# Patient Record
Sex: MALE | Race: OTHER | URBAN - METROPOLITAN AREA
[De-identification: names, ages, dates, MRNs, and addresses within clinical notes are randomized per-mention and may not be internally consistent; named-entity substitution may affect disease eponyms.]

---

## 2017-07-07 ENCOUNTER — EMERGENCY (EMERGENCY)
Facility: HOSPITAL | Age: 62
LOS: 0 days | Discharge: HOME | End: 2017-07-07

## 2017-07-07 DIAGNOSIS — E11.65 TYPE 2 DIABETES MELLITUS WITH HYPERGLYCEMIA: ICD-10-CM

## 2017-07-07 DIAGNOSIS — I10 ESSENTIAL (PRIMARY) HYPERTENSION: ICD-10-CM

## 2022-10-26 ENCOUNTER — INPATIENT (INPATIENT)
Facility: HOSPITAL | Age: 67
LOS: 6 days | Discharge: HOME | End: 2022-11-02
Attending: STUDENT IN AN ORGANIZED HEALTH CARE EDUCATION/TRAINING PROGRAM | Admitting: STUDENT IN AN ORGANIZED HEALTH CARE EDUCATION/TRAINING PROGRAM

## 2022-10-26 VITALS
TEMPERATURE: 103 F | SYSTOLIC BLOOD PRESSURE: 116 MMHG | HEART RATE: 111 BPM | WEIGHT: 175.05 LBS | RESPIRATION RATE: 20 BRPM | OXYGEN SATURATION: 95 % | DIASTOLIC BLOOD PRESSURE: 61 MMHG

## 2022-10-26 LAB
ALBUMIN SERPL ELPH-MCNC: 3.3 G/DL — LOW (ref 3.5–5.2)
ALP SERPL-CCNC: 96 U/L — SIGNIFICANT CHANGE UP (ref 30–115)
ALT FLD-CCNC: 58 U/L — HIGH (ref 0–41)
ANION GAP SERPL CALC-SCNC: 15 MMOL/L — HIGH (ref 7–14)
AST SERPL-CCNC: 57 U/L — HIGH (ref 0–41)
BASOPHILS # BLD AUTO: 0.03 K/UL — SIGNIFICANT CHANGE UP (ref 0–0.2)
BASOPHILS NFR BLD AUTO: 0.2 % — SIGNIFICANT CHANGE UP (ref 0–1)
BILIRUB SERPL-MCNC: 0.5 MG/DL — SIGNIFICANT CHANGE UP (ref 0.2–1.2)
BUN SERPL-MCNC: 29 MG/DL — HIGH (ref 10–20)
CALCIUM SERPL-MCNC: 8.3 MG/DL — LOW (ref 8.4–10.5)
CHLORIDE SERPL-SCNC: 92 MMOL/L — LOW (ref 98–110)
CO2 SERPL-SCNC: 23 MMOL/L — SIGNIFICANT CHANGE UP (ref 17–32)
CREAT SERPL-MCNC: 1.6 MG/DL — HIGH (ref 0.7–1.5)
EGFR: 47 ML/MIN/1.73M2 — LOW
EOSINOPHIL # BLD AUTO: 0 K/UL — SIGNIFICANT CHANGE UP (ref 0–0.7)
EOSINOPHIL NFR BLD AUTO: 0 % — SIGNIFICANT CHANGE UP (ref 0–8)
GLUCOSE SERPL-MCNC: 328 MG/DL — HIGH (ref 70–99)
HCT VFR BLD CALC: 34.9 % — LOW (ref 42–52)
HGB BLD-MCNC: 12.3 G/DL — LOW (ref 14–18)
IMM GRANULOCYTES NFR BLD AUTO: 0.7 % — HIGH (ref 0.1–0.3)
LACTATE SERPL-SCNC: 2.1 MMOL/L — HIGH (ref 0.7–2)
LYMPHOCYTES # BLD AUTO: 0.66 K/UL — LOW (ref 1.2–3.4)
LYMPHOCYTES # BLD AUTO: 4.8 % — LOW (ref 20.5–51.1)
MCHC RBC-ENTMCNC: 30.3 PG — SIGNIFICANT CHANGE UP (ref 27–31)
MCHC RBC-ENTMCNC: 35.2 G/DL — SIGNIFICANT CHANGE UP (ref 32–37)
MCV RBC AUTO: 86 FL — SIGNIFICANT CHANGE UP (ref 80–94)
MONOCYTES # BLD AUTO: 1.04 K/UL — HIGH (ref 0.1–0.6)
MONOCYTES NFR BLD AUTO: 7.6 % — SIGNIFICANT CHANGE UP (ref 1.7–9.3)
NEUTROPHILS # BLD AUTO: 11.95 K/UL — HIGH (ref 1.4–6.5)
NEUTROPHILS NFR BLD AUTO: 86.7 % — HIGH (ref 42.2–75.2)
NRBC # BLD: 0 /100 WBCS — SIGNIFICANT CHANGE UP (ref 0–0)
PLATELET # BLD AUTO: 242 K/UL — SIGNIFICANT CHANGE UP (ref 130–400)
POTASSIUM SERPL-MCNC: 3.6 MMOL/L — SIGNIFICANT CHANGE UP (ref 3.5–5)
POTASSIUM SERPL-SCNC: 3.6 MMOL/L — SIGNIFICANT CHANGE UP (ref 3.5–5)
PROT SERPL-MCNC: 6.2 G/DL — SIGNIFICANT CHANGE UP (ref 6–8)
RBC # BLD: 4.06 M/UL — LOW (ref 4.7–6.1)
RBC # FLD: 12.9 % — SIGNIFICANT CHANGE UP (ref 11.5–14.5)
SODIUM SERPL-SCNC: 130 MMOL/L — LOW (ref 135–146)
WBC # BLD: 13.77 K/UL — HIGH (ref 4.8–10.8)
WBC # FLD AUTO: 13.77 K/UL — HIGH (ref 4.8–10.8)

## 2022-10-26 PROCEDURE — 71045 X-RAY EXAM CHEST 1 VIEW: CPT | Mod: 26

## 2022-10-26 PROCEDURE — 93010 ELECTROCARDIOGRAM REPORT: CPT

## 2022-10-26 PROCEDURE — 99285 EMERGENCY DEPT VISIT HI MDM: CPT

## 2022-10-26 RX ORDER — SODIUM CHLORIDE 9 MG/ML
2000 INJECTION INTRAMUSCULAR; INTRAVENOUS; SUBCUTANEOUS ONCE
Refills: 0 | Status: COMPLETED | OUTPATIENT
Start: 2022-10-26 | End: 2022-10-26

## 2022-10-26 RX ORDER — CEFTRIAXONE 500 MG/1
1000 INJECTION, POWDER, FOR SOLUTION INTRAMUSCULAR; INTRAVENOUS ONCE
Refills: 0 | Status: COMPLETED | OUTPATIENT
Start: 2022-10-26 | End: 2022-10-26

## 2022-10-26 RX ORDER — AZITHROMYCIN 500 MG/1
500 TABLET, FILM COATED ORAL ONCE
Refills: 0 | Status: COMPLETED | OUTPATIENT
Start: 2022-10-26 | End: 2022-10-26

## 2022-10-26 RX ORDER — ACETAMINOPHEN 500 MG
975 TABLET ORAL ONCE
Refills: 0 | Status: COMPLETED | OUTPATIENT
Start: 2022-10-26 | End: 2022-10-26

## 2022-10-26 RX ADMIN — AZITHROMYCIN 255 MILLIGRAM(S): 500 TABLET, FILM COATED ORAL at 23:30

## 2022-10-26 RX ADMIN — CEFTRIAXONE 100 MILLIGRAM(S): 500 INJECTION, POWDER, FOR SOLUTION INTRAMUSCULAR; INTRAVENOUS at 23:30

## 2022-10-26 RX ADMIN — Medication 975 MILLIGRAM(S): at 23:20

## 2022-10-26 RX ADMIN — SODIUM CHLORIDE 2000 MILLILITER(S): 9 INJECTION INTRAMUSCULAR; INTRAVENOUS; SUBCUTANEOUS at 23:30

## 2022-10-26 NOTE — ED ADULT NURSE NOTE - CHIEF COMPLAINT QUOTE
Pt c/o sob, fever, cough X 6 days. Was diagnosed w PNA by pmd, started antibiotics. Pt had two COVID swabs negative

## 2022-10-26 NOTE — ED ADULT TRIAGE NOTE - CHIEF COMPLAINT QUOTE
Pt c/o sob, fever, cough X 6 days. Was diagnosed w PNA by pmd, started antibiotics Pt c/o sob, fever, cough X 6 days. Was diagnosed w PNA by pmd, started antibiotics. Pt had two COVID swabs negative

## 2022-10-26 NOTE — ED ADULT NURSE NOTE - OBJECTIVE STATEMENT
pt presents co SOB, generalized weakness. Pt was seen at his PCP and found to have pneumonia on scans and was sent for evaluation. Pt AAOx4, speaking in clear and complete sentences.

## 2022-10-27 LAB
ALBUMIN SERPL ELPH-MCNC: 3.3 G/DL — LOW (ref 3.5–5.2)
ALP SERPL-CCNC: 103 U/L — SIGNIFICANT CHANGE UP (ref 30–115)
ALT FLD-CCNC: 64 U/L — HIGH (ref 0–41)
ANION GAP SERPL CALC-SCNC: 16 MMOL/L — HIGH (ref 7–14)
AST SERPL-CCNC: 63 U/L — HIGH (ref 0–41)
BASE EXCESS BLDV CALC-SCNC: -1.6 MMOL/L — SIGNIFICANT CHANGE UP (ref -2–3)
BASOPHILS # BLD AUTO: 0.04 K/UL — SIGNIFICANT CHANGE UP (ref 0–0.2)
BASOPHILS NFR BLD AUTO: 0.3 % — SIGNIFICANT CHANGE UP (ref 0–1)
BILIRUB SERPL-MCNC: 0.5 MG/DL — SIGNIFICANT CHANGE UP (ref 0.2–1.2)
BUN SERPL-MCNC: 19 MG/DL — SIGNIFICANT CHANGE UP (ref 10–20)
CA-I SERPL-SCNC: 1.03 MMOL/L — LOW (ref 1.15–1.33)
CALCIUM SERPL-MCNC: 8.3 MG/DL — LOW (ref 8.4–10.5)
CHLORIDE SERPL-SCNC: 103 MMOL/L — SIGNIFICANT CHANGE UP (ref 98–110)
CO2 SERPL-SCNC: 25 MMOL/L — SIGNIFICANT CHANGE UP (ref 17–32)
CREAT ?TM UR-MCNC: 116 MG/DL — SIGNIFICANT CHANGE UP
CREAT SERPL-MCNC: 0.9 MG/DL — SIGNIFICANT CHANGE UP (ref 0.7–1.5)
EGFR: 93 ML/MIN/1.73M2 — SIGNIFICANT CHANGE UP
EOSINOPHIL # BLD AUTO: 0.03 K/UL — SIGNIFICANT CHANGE UP (ref 0–0.7)
EOSINOPHIL NFR BLD AUTO: 0.2 % — SIGNIFICANT CHANGE UP (ref 0–8)
FLUAV AG NPH QL: SIGNIFICANT CHANGE UP
FLUBV AG NPH QL: SIGNIFICANT CHANGE UP
GAS PNL BLDV: 129 MMOL/L — LOW (ref 136–145)
GAS PNL BLDV: SIGNIFICANT CHANGE UP
GAS PNL BLDV: SIGNIFICANT CHANGE UP
GLUCOSE BLDC GLUCOMTR-MCNC: 134 MG/DL — HIGH (ref 70–99)
GLUCOSE BLDC GLUCOMTR-MCNC: 179 MG/DL — HIGH (ref 70–99)
GLUCOSE BLDC GLUCOMTR-MCNC: 221 MG/DL — HIGH (ref 70–99)
GLUCOSE BLDC GLUCOMTR-MCNC: 270 MG/DL — HIGH (ref 70–99)
GLUCOSE SERPL-MCNC: 166 MG/DL — HIGH (ref 70–99)
HCO3 BLDV-SCNC: 23 MMOL/L — SIGNIFICANT CHANGE UP (ref 22–29)
HCT VFR BLD CALC: 37.1 % — LOW (ref 42–52)
HCT VFR BLDA CALC: 39 % — SIGNIFICANT CHANGE UP (ref 39–51)
HGB BLD CALC-MCNC: 13 G/DL — SIGNIFICANT CHANGE UP (ref 12.6–17.4)
HGB BLD-MCNC: 12.5 G/DL — LOW (ref 14–18)
IMM GRANULOCYTES NFR BLD AUTO: 0.8 % — HIGH (ref 0.1–0.3)
LACTATE BLDV-MCNC: 1.4 MMOL/L — SIGNIFICANT CHANGE UP (ref 0.5–2)
LYMPHOCYTES # BLD AUTO: 0.82 K/UL — LOW (ref 1.2–3.4)
LYMPHOCYTES # BLD AUTO: 6.2 % — LOW (ref 20.5–51.1)
MAGNESIUM SERPL-MCNC: 1.5 MG/DL — LOW (ref 1.8–2.4)
MCHC RBC-ENTMCNC: 29.6 PG — SIGNIFICANT CHANGE UP (ref 27–31)
MCHC RBC-ENTMCNC: 33.7 G/DL — SIGNIFICANT CHANGE UP (ref 32–37)
MCV RBC AUTO: 87.7 FL — SIGNIFICANT CHANGE UP (ref 80–94)
MONOCYTES # BLD AUTO: 1.19 K/UL — HIGH (ref 0.1–0.6)
MONOCYTES NFR BLD AUTO: 8.9 % — SIGNIFICANT CHANGE UP (ref 1.7–9.3)
MRSA PCR RESULT.: NEGATIVE — SIGNIFICANT CHANGE UP
NEUTROPHILS # BLD AUTO: 11.12 K/UL — HIGH (ref 1.4–6.5)
NEUTROPHILS NFR BLD AUTO: 83.6 % — HIGH (ref 42.2–75.2)
NRBC # BLD: 0 /100 WBCS — SIGNIFICANT CHANGE UP (ref 0–0)
PCO2 BLDV: 37 MMHG — LOW (ref 42–55)
PH BLDV: 7.4 — SIGNIFICANT CHANGE UP (ref 7.32–7.43)
PLATELET # BLD AUTO: 272 K/UL — SIGNIFICANT CHANGE UP (ref 130–400)
PO2 BLDV: 38 MMHG — SIGNIFICANT CHANGE UP
POTASSIUM BLDV-SCNC: 3.3 MMOL/L — LOW (ref 3.5–5.1)
POTASSIUM SERPL-MCNC: 3.7 MMOL/L — SIGNIFICANT CHANGE UP (ref 3.5–5)
POTASSIUM SERPL-SCNC: 3.7 MMOL/L — SIGNIFICANT CHANGE UP (ref 3.5–5)
PROT ?TM UR-MCNC: 100 MG/DLG/24H — SIGNIFICANT CHANGE UP
PROT SERPL-MCNC: 6.4 G/DL — SIGNIFICANT CHANGE UP (ref 6–8)
PROT/CREAT UR-RTO: 0.9 RATIO — HIGH (ref 0–0.2)
RBC # BLD: 4.23 M/UL — LOW (ref 4.7–6.1)
RBC # FLD: 13.3 % — SIGNIFICANT CHANGE UP (ref 11.5–14.5)
RSV RNA NPH QL NAA+NON-PROBE: SIGNIFICANT CHANGE UP
SAO2 % BLDV: 69.4 % — SIGNIFICANT CHANGE UP
SARS-COV-2 RNA SPEC QL NAA+PROBE: SIGNIFICANT CHANGE UP
SODIUM SERPL-SCNC: 144 MMOL/L — SIGNIFICANT CHANGE UP (ref 135–146)
SODIUM UR-SCNC: 75 MMOL/L — SIGNIFICANT CHANGE UP
WBC # BLD: 13.31 K/UL — HIGH (ref 4.8–10.8)
WBC # FLD AUTO: 13.31 K/UL — HIGH (ref 4.8–10.8)

## 2022-10-27 PROCEDURE — 99223 1ST HOSP IP/OBS HIGH 75: CPT

## 2022-10-27 PROCEDURE — 76775 US EXAM ABDO BACK WALL LIM: CPT | Mod: 26

## 2022-10-27 RX ORDER — ATORVASTATIN CALCIUM 80 MG/1
40 TABLET, FILM COATED ORAL AT BEDTIME
Refills: 0 | Status: DISCONTINUED | OUTPATIENT
Start: 2022-10-27 | End: 2022-10-28

## 2022-10-27 RX ORDER — MAGNESIUM SULFATE 500 MG/ML
2 VIAL (ML) INJECTION
Refills: 0 | Status: COMPLETED | OUTPATIENT
Start: 2022-10-27 | End: 2022-10-27

## 2022-10-27 RX ORDER — SODIUM CHLORIDE 9 MG/ML
500 INJECTION, SOLUTION INTRAVENOUS ONCE
Refills: 0 | Status: COMPLETED | OUTPATIENT
Start: 2022-10-27 | End: 2022-10-27

## 2022-10-27 RX ORDER — INSULIN LISPRO 100/ML
5 VIAL (ML) SUBCUTANEOUS
Refills: 0 | Status: DISCONTINUED | OUTPATIENT
Start: 2022-10-27 | End: 2022-10-31

## 2022-10-27 RX ORDER — AZITHROMYCIN 500 MG/1
500 TABLET, FILM COATED ORAL EVERY 24 HOURS
Refills: 0 | Status: DISCONTINUED | OUTPATIENT
Start: 2022-10-27 | End: 2022-10-27

## 2022-10-27 RX ORDER — DEXTROSE 50 % IN WATER 50 %
25 SYRINGE (ML) INTRAVENOUS ONCE
Refills: 0 | Status: DISCONTINUED | OUTPATIENT
Start: 2022-10-27 | End: 2022-11-02

## 2022-10-27 RX ORDER — DEXTROSE 50 % IN WATER 50 %
15 SYRINGE (ML) INTRAVENOUS ONCE
Refills: 0 | Status: DISCONTINUED | OUTPATIENT
Start: 2022-10-27 | End: 2022-11-02

## 2022-10-27 RX ORDER — INSULIN GLARGINE 100 [IU]/ML
6 INJECTION, SOLUTION SUBCUTANEOUS AT BEDTIME
Refills: 0 | Status: DISCONTINUED | OUTPATIENT
Start: 2022-10-27 | End: 2022-10-28

## 2022-10-27 RX ORDER — GLUCAGON INJECTION, SOLUTION 0.5 MG/.1ML
1 INJECTION, SOLUTION SUBCUTANEOUS ONCE
Refills: 0 | Status: DISCONTINUED | OUTPATIENT
Start: 2022-10-27 | End: 2022-11-02

## 2022-10-27 RX ORDER — ACETAMINOPHEN 500 MG
650 TABLET ORAL EVERY 6 HOURS
Refills: 0 | Status: DISCONTINUED | OUTPATIENT
Start: 2022-10-27 | End: 2022-11-02

## 2022-10-27 RX ORDER — INSULIN LISPRO 100/ML
VIAL (ML) SUBCUTANEOUS
Refills: 0 | Status: DISCONTINUED | OUTPATIENT
Start: 2022-10-27 | End: 2022-11-02

## 2022-10-27 RX ORDER — SODIUM CHLORIDE 9 MG/ML
1000 INJECTION, SOLUTION INTRAVENOUS
Refills: 0 | Status: DISCONTINUED | OUTPATIENT
Start: 2022-10-27 | End: 2022-11-02

## 2022-10-27 RX ORDER — DEXTROSE 50 % IN WATER 50 %
12.5 SYRINGE (ML) INTRAVENOUS ONCE
Refills: 0 | Status: DISCONTINUED | OUTPATIENT
Start: 2022-10-27 | End: 2022-11-02

## 2022-10-27 RX ORDER — CEFTRIAXONE 500 MG/1
1000 INJECTION, POWDER, FOR SOLUTION INTRAMUSCULAR; INTRAVENOUS EVERY 24 HOURS
Refills: 0 | Status: DISCONTINUED | OUTPATIENT
Start: 2022-10-27 | End: 2022-10-29

## 2022-10-27 RX ORDER — HEPARIN SODIUM 5000 [USP'U]/ML
5000 INJECTION INTRAVENOUS; SUBCUTANEOUS EVERY 8 HOURS
Refills: 0 | Status: DISCONTINUED | OUTPATIENT
Start: 2022-10-27 | End: 2022-10-31

## 2022-10-27 RX ORDER — ENOXAPARIN SODIUM 100 MG/ML
40 INJECTION SUBCUTANEOUS EVERY 24 HOURS
Refills: 0 | Status: DISCONTINUED | OUTPATIENT
Start: 2022-10-27 | End: 2022-10-27

## 2022-10-27 RX ORDER — SODIUM CHLORIDE 9 MG/ML
1000 INJECTION INTRAMUSCULAR; INTRAVENOUS; SUBCUTANEOUS
Refills: 0 | Status: DISCONTINUED | OUTPATIENT
Start: 2022-10-27 | End: 2022-10-30

## 2022-10-27 RX ADMIN — SODIUM CHLORIDE 75 MILLILITER(S): 9 INJECTION INTRAMUSCULAR; INTRAVENOUS; SUBCUTANEOUS at 07:53

## 2022-10-27 RX ADMIN — Medication 25 GRAM(S): at 15:45

## 2022-10-27 RX ADMIN — SODIUM CHLORIDE 1000 MILLILITER(S): 9 INJECTION, SOLUTION INTRAVENOUS at 01:13

## 2022-10-27 RX ADMIN — Medication 25 GRAM(S): at 15:47

## 2022-10-27 RX ADMIN — Medication 1: at 17:48

## 2022-10-27 RX ADMIN — Medication 110 MILLIGRAM(S): at 18:01

## 2022-10-27 RX ADMIN — Medication 110 MILLIGRAM(S): at 07:53

## 2022-10-27 RX ADMIN — Medication 5 UNIT(S): at 18:07

## 2022-10-27 RX ADMIN — Medication 650 MILLIGRAM(S): at 08:06

## 2022-10-27 RX ADMIN — INSULIN GLARGINE 6 UNIT(S): 100 INJECTION, SOLUTION SUBCUTANEOUS at 22:25

## 2022-10-27 RX ADMIN — Medication 2: at 08:08

## 2022-10-27 RX ADMIN — HEPARIN SODIUM 5000 UNIT(S): 5000 INJECTION INTRAVENOUS; SUBCUTANEOUS at 22:26

## 2022-10-27 RX ADMIN — SODIUM CHLORIDE 75 MILLILITER(S): 9 INJECTION INTRAMUSCULAR; INTRAVENOUS; SUBCUTANEOUS at 22:25

## 2022-10-27 RX ADMIN — ENOXAPARIN SODIUM 40 MILLIGRAM(S): 100 INJECTION SUBCUTANEOUS at 06:01

## 2022-10-27 RX ADMIN — Medication 5 UNIT(S): at 08:08

## 2022-10-27 RX ADMIN — ATORVASTATIN CALCIUM 40 MILLIGRAM(S): 80 TABLET, FILM COATED ORAL at 22:25

## 2022-10-27 RX ADMIN — HEPARIN SODIUM 5000 UNIT(S): 5000 INJECTION INTRAVENOUS; SUBCUTANEOUS at 15:46

## 2022-10-27 RX ADMIN — Medication 5 UNIT(S): at 14:00

## 2022-10-27 NOTE — ED PROVIDER NOTE - ADMIT DISPOSITION PRESENT ON ADMISSION SEPSIS Q3 - REPEAT LACTATE WAS DRAWN
----- Message from Keron Farmer sent at 3/8/2021  9:26 AM EST -----  Subject: Refill Request    QUESTIONS  Name of Medication? metFORMIN (GLUCOPHAGE) 1000 MG tablet  Patient-reported dosage and instructions? 1000mg   How many days do you have left? 1  Preferred Pharmacy? [facility] phone number (if available)? [phone]  ---------------------------------------------------------------------------  --------------    Name of Medication? hydroCHLOROthiazide (MICROZIDE) 12.5 MG capsule  Patient-reported dosage and instructions? 12.5mg  How many days do you have left? 1  Preferred Pharmacy? [facility] phone number (if available)? [phone]  ---------------------------------------------------------------------------  --------------    Name of Medication? zolpidem (AMBIEN) 10 MG tablet  Patient-reported dosage and instructions? 10mg  How many days do you have left? 1  Preferred Pharmacy? [facility] phone number (if available)? [phone]  Additional Information for Provider? Please double check pharmacy with   patient  ---------------------------------------------------------------------------  --------------  [address]  What is the best way for the office to contact you? OK to leave message on   voicemail  Preferred Call Back Phone Number?  [phone] Repeat lactate was drawn.

## 2022-10-27 NOTE — H&P ADULT - ASSESSMENT
66 yo male hx of HTN, HLD, DM presenting for fever and productive cough. Pt with productive cough with green sputum for about a week. Today started having fever. Denies sick contacts or recent travel. Pt evaluated by PMD earlier today and had CXR which found PNA for which he advised him to come to ED for evaluation. Pt denies chills, weakness, chest pain, sob, abd pain/n/v/d, or urinary sxs.      ED course:   VS: Tmax 103.5, /61, , SpO2 95% on RA     LABS:                          12.3   13.77 )-----------( 242      ( 26 Oct 2022 22:36 )             34.9     10-26    130<L>  |  92<L>  |  29<H>  ----------------------------<  328<H>  3.6   |  23  |  1.6<H>    Ca    8.3<L>      26 Oct 2022 22:36    TPro  6.2  /  Alb  3.3<L>  /  TBili  0.5  /  DBili  x   /  AST  57<H>  /  ALT  58<H>  /  AlkPhos  96  10-26    Imaging:   CXR:R lobar pneumonia         # Sepsis 2/2 to R lobar pneumonia most likely 2/2 to Strep pneumo   - Tmax 103.5, , /61  - WBC 12.3 K   - SPo2 95%  on RA  - RVP neg   - MRSA, urine strep, urine legionella, sputum culture ordered   - FU on blood Cx   - IV Abx: rocephin and azithro  - NS @ 75 cc/hr       # hypovolemic hyponatremia  - Na 131 on admission   - started on NS @ 75cc/hr  - monitor        # prerenal SANCHEZ 2/2 to hypovolemia   - Cr 1.6 on admission  - Cr 0.8 in 2020   - started on NS @75cc/hr   - avoid nephrotoxic drugs  - obtain renal US, urine studies if no improvement            #HTN       # DM          #DVT: Lovenox    # DIET: Regular    66 yo male hx of HTN, HLD, DM presenting for fever and productive cough. Pt with productive cough with green sputum for about a week.       # Sepsis 2/2 to R lobar pneumonia most likely 2/2 to Strep pneumo   - Tmax 103.5, , /61  - WBC 12.3 K   - SPo2 95%  on RA  - RVP neg   - MRSA, urine strep, urine legionella, sputum culture ordered   - FU on blood Cx   - IV Abx: rocephin and azithro  - NS @ 75 cc/hr       # hypovolemic hyponatremia  - Na 131 on admission   - started on NS @ 75cc/hr  - monitor        # prerenal SANCHEZ 2/2 to hypovolemia   - Cr 1.6 on admission  - Cr 0.8 in 2020   - started on NS @75cc/hr   - avoid nephrotoxic drugs  - obtain renal US, urine studies if no improvement            #HTN   - will keep off BP meds for borderline low BP in ED       # DM  # hld  - will start on lantus 6 and lispro 2 TID  - Monitor Fs        # DVT: Lovenox  # DIET: Regular     PT DOES NOT KNOW WHAT MEDS HE IS ON PLEASE OBTAIN MED REC IN AM

## 2022-10-27 NOTE — ED PROVIDER NOTE - PHYSICAL EXAMINATION
CONSTITUTIONAL: Well-appearing; well-nourished; in no apparent distress.   EYES: PERRL; EOM intact.   CARDIOVASCULAR: Normal S1, S2; no murmurs, rubs, or gallops.   RESPIRATORY: +rhonchi to Rt lung. RR - 16. no accessory muscles use. Normal chest excursion with respiration; breath sounds clear and equal bilaterally;   GI/: Normal bowel sounds; non-distended; non-tender; no palpable organomegaly.   MS: No calf swelling and tenderness.  SKIN: Normal for age and race; warm; dry; good turgor; no apparent lesions or exudate.   NEURO/PSYCH: A & O x 4; grossly unremarkable.

## 2022-10-27 NOTE — H&P ADULT - ATTENDING COMMENTS
Patient seen and examined at bedside independently of the residents. I read the resident's note and agree with the plan with the additions and corrections as noted below.    REVIEW OF SYSTEMS:  CONSTITUTIONAL: No weakness. Fever, chills.   EYES/ENT: No visual changes;  No vertigo or throat pain   NECK: No pain or stiffness  RESPIRATORY: No wheezing, hemoptysis; No shortness of breath. + productive cough.   CARDIOVASCULAR: No chest pain or palpitations  GASTROINTESTINAL: No abdominal or epigastric pain. No nausea, vomiting, or hematemesis; No diarrhea or constipation. No melena or hematochezia.  GENITOURINARY: No dysuria, frequency or hematuria  NEUROLOGICAL: No numbness or weakness  MSK: No pain. No weakness.   SKIN: No itching, rashes.     PMH: HTN, HLD, DM II     FHx: Reviewed. Not relevant.     Physical Exam:  GEN: No acute distress. A & O x 3.   Head: Atraumatic, Normocephalic.   Eye: PEERLA. No sclera icterus. EOMI.   ENT: Normal oropharynx, no thyromegaly.   LUNGS: Clear to auscultation bilaterally. No wheeze/rales/crackles.   HEART: Normal. S1/S2 present. RRR. No murmur/gallops.   ABD: Soft, non-tender, non-distended. Bowel sounds present.   EXT: No pitting edema.   Integumentary: No rash, No petechia.   NEURO: CN III-XII intact. Strength: 5/5 b/l ULE. Sensory intact b/l ULE.     Vital Signs Last 24 Hrs  T(C): 37.7 (27 Oct 2022 06:05), Max: 39.7 (26 Oct 2022 20:24)  T(F): 99.9 (27 Oct 2022 06:05), Max: 103.5 (26 Oct 2022 20:24)  HR: 96 (27 Oct 2022 06:05) (96 - 111)  BP: 141/61 (27 Oct 2022 06:05) (106/60 - 141/61)  BP(mean): --  RR: 16 (27 Oct 2022 06:05) (16 - 20)  SpO2: 99% (27 Oct 2022 06:05) (95% - 99%)    Parameters below as of 27 Oct 2022 06:05  Patient On (Oxygen Delivery Method): room air      Please see the above notes for Labs and radiology.     Assessment and Plan:     68 yo M with hx of HTN, HLD and DM II presents to ED for 1 week history of productive cough with fever started yesterday prompting him to went to his PMD. His PMD found PNA on CXR and advised him to come to ED.     Sepsis likely 2/2 CAP   - CXR shows RUL consolidation/opacification.  - s/p azithro and ceftriaxone in ED.  - will c/w doxycycline and ceftriaxone   - f/u BCx, sputum Cx and atypical PNA panel   - supportive care.    SANCHEZ   - serum Cr 1.6. Baseline 0.8  - c/w IVF NS   - repeat BMP   - renal US and urine studies.   - avoid nephrotoxic drugs.     Mild hyponatremia  - likely 2/2 dehydration  - c/w IVF NS @ 75cc/hr   - repeat BMP    HTN/HLD - hold anti-HTN med for now.     Hyperglycemia likely 2/2 uncontrolled DM II - check HbA1c. monitor FS AC HS. insulin regimen.     DVT ppx: heparin SC   GI ppx: not indicated.    Diet: DASH   Activity: as tolerated.     Date seen by the attending: 10/27/2022.

## 2022-10-27 NOTE — ED PROVIDER NOTE - OBJECTIVE STATEMENT
66 yo male hx of HTN/HLD/DM present c/o coughing with green sputum for about a week. started having fever today. denies sick contact and recent travel. had negative COVID test at outpatient. reported he was evaluated by PMD earlier today and had CXR which found PNA and advised him to come to ED for evaluation. denies chills/weakness/chest pain/sob/abd pain/n/v/d and urinary sxs.

## 2022-10-27 NOTE — H&P ADULT - NSHPLABSRESULTS_GEN_ALL_CORE
LABS:  cret                        12.3   13.77 )-----------( 242      ( 26 Oct 2022 22:36 )             34.9     10-26    130<L>  |  92<L>  |  29<H>  ----------------------------<  328<H>  3.6   |  23  |  1.6<H>    Ca    8.3<L>      26 Oct 2022 22:36    TPro  6.2  /  Alb  3.3<L>  /  TBili  0.5  /  DBili  x   /  AST  57<H>  /  ALT  58<H>  /  AlkPhos  96  10-26

## 2022-10-27 NOTE — H&P ADULT - NSHPPHYSICALEXAM_GEN_ALL_CORE
CONSTITUTIONAL: Well-appearing; well-nourished; in no apparent distress.   EYES: PERRL; EOM intact.   CARDIOVASCULAR: Normal S1, S2; no murmurs, rubs, or gallops.   RESPIRATORY: +rhonchi to Rt lung. RR - 16. no accessory muscles use. Normal chest excursion with respiration; breath sounds clear and equal bilaterally;   GI/: Normal bowel sounds; non-distended; non-tender; no palpable organomegaly.   MS: No calf swelling and tenderness.  SKIN: Normal for age and race; warm; dry; good turgor; no apparent lesions or exudate.   NEURO/PSYCH: A & O x 4; grossly unremarkable.
none

## 2022-10-27 NOTE — H&P ADULT - HISTORY OF PRESENT ILLNESS
68 yo male hx of HTN, HLD, DM presenting for fever and productive cough. Pt with productive cough with green sputum for about a week. Today started having fever. Denies sick contacts or recent travel. Pt evaluated by PMD earlier today and had CXR which found PNA for which he advised him to come to ED for evaluation. Pt denies chills, weakness, chest pain, sob, abd pain/n/v/d, or urinary sxs.      ED course:   VS: Tmax 103.5, /61, , SpO2 95% on RA     LABS:                          12.3   13.77 )-----------( 242      ( 26 Oct 2022 22:36 )             34.9     10-26    130<L>  |  92<L>  |  29<H>  ----------------------------<  328<H>  3.6   |  23  |  1.6<H>    Ca    8.3<L>      26 Oct 2022 22:36    TPro  6.2  /  Alb  3.3<L>  /  TBili  0.5  /  DBili  x   /  AST  57<H>  /  ALT  58<H>  /  AlkPhos  96  10-26    Imaging:   CXR:R lobar pneumonia          68 yo male, Lithuanian speaking,  PMhx of HTN, HLD, DM presenting for fever and productive cough. Pt with productive cough  green sputum for about a week. Today, started having fever which prompted him to go his PMD earlier today where he had a CXR that showed a PNA for which he was advised to come to ED for evaluation. Denies sick contacts or recent travel. Pt denies chills, weakness, chest pain, sob, abd pain/n/v/d, or urinary sxs.      ED course:   VS: Tmax 103.5, /61, , SpO2 95% on RA     LABS:                          12.3   13.77 )-----------( 242      ( 26 Oct 2022 22:36 )             34.9     10-26    130<L>  |  92<L>  |  29<H>  ----------------------------<  328<H>  3.6   |  23  |  1.6<H>    Ca    8.3<L>      26 Oct 2022 22:36    TPro  6.2  /  Alb  3.3<L>  /  TBili  0.5  /  DBili  x   /  AST  57<H>  /  ALT  58<H>  /  AlkPhos  96  10-26    Imaging:   CXR:R lobar pneumonia

## 2022-10-27 NOTE — ED PROVIDER NOTE - CLINICAL SUMMARY MEDICAL DECISION MAKING FREE TEXT BOX
68 yo M, hx of HTN, DM II currently poorly controlled, HLD here for assessment of cough with sputum x 1 week and now fever today -- saw PMD who found him to have significant PNA on XR.    In ED febrile but without hypotension, hypoxia. Has decreased BS on R, cough with green sputuml.    WBC 13, lactate 2.1, Cr 1.6, glucose 300.     CXR with R upper lobar PNA.     Given age, risk factors, poorly controlled DM, extensive PNA, will need IV abx. Received ceftriaxone and azithro.

## 2022-10-27 NOTE — ED PROVIDER NOTE - CARE PLAN
1 Principal Discharge DX:	Pneumonia  Secondary Diagnosis:	Sepsis  Secondary Diagnosis:	SANCHEZ (acute kidney injury)

## 2022-10-27 NOTE — ED PROVIDER NOTE - NS ED ATTENDING STATEMENT MOD
This was a shared visit with the MIRTA. I reviewed and verified the documentation and independently performed the documented:

## 2022-10-28 LAB
A1C WITH ESTIMATED AVERAGE GLUCOSE RESULT: 8.1 % — HIGH (ref 4–5.6)
ALBUMIN SERPL ELPH-MCNC: 3.1 G/DL — LOW (ref 3.5–5.2)
ALP SERPL-CCNC: 103 U/L — SIGNIFICANT CHANGE UP (ref 30–115)
ALT FLD-CCNC: 63 U/L — HIGH (ref 0–41)
ANION GAP SERPL CALC-SCNC: 14 MMOL/L — SIGNIFICANT CHANGE UP (ref 7–14)
AST SERPL-CCNC: 65 U/L — HIGH (ref 0–41)
BILIRUB SERPL-MCNC: 0.6 MG/DL — SIGNIFICANT CHANGE UP (ref 0.2–1.2)
BUN SERPL-MCNC: 14 MG/DL — SIGNIFICANT CHANGE UP (ref 10–20)
CALCIUM SERPL-MCNC: 7.8 MG/DL — LOW (ref 8.4–10.5)
CHLORIDE SERPL-SCNC: 99 MMOL/L — SIGNIFICANT CHANGE UP (ref 98–110)
CO2 SERPL-SCNC: 21 MMOL/L — SIGNIFICANT CHANGE UP (ref 17–32)
CREAT SERPL-MCNC: 0.8 MG/DL — SIGNIFICANT CHANGE UP (ref 0.7–1.5)
EGFR: 96 ML/MIN/1.73M2 — SIGNIFICANT CHANGE UP
ESTIMATED AVERAGE GLUCOSE: 186 MG/DL — HIGH (ref 68–114)
GLUCOSE BLDC GLUCOMTR-MCNC: 151 MG/DL — HIGH (ref 70–99)
GLUCOSE BLDC GLUCOMTR-MCNC: 225 MG/DL — HIGH (ref 70–99)
GLUCOSE BLDC GLUCOMTR-MCNC: 225 MG/DL — HIGH (ref 70–99)
GLUCOSE BLDC GLUCOMTR-MCNC: 236 MG/DL — HIGH (ref 70–99)
GLUCOSE SERPL-MCNC: 200 MG/DL — HIGH (ref 70–99)
GRAM STN FLD: SIGNIFICANT CHANGE UP
HCT VFR BLD CALC: 32.5 % — LOW (ref 42–52)
HCV AB S/CO SERPL IA: 0.03 COI — SIGNIFICANT CHANGE UP
HCV AB SERPL-IMP: SIGNIFICANT CHANGE UP
HGB BLD-MCNC: 11.4 G/DL — LOW (ref 14–18)
LEGIONELLA AG UR QL: POSITIVE
MCHC RBC-ENTMCNC: 29.8 PG — SIGNIFICANT CHANGE UP (ref 27–31)
MCHC RBC-ENTMCNC: 35.1 G/DL — SIGNIFICANT CHANGE UP (ref 32–37)
MCV RBC AUTO: 84.9 FL — SIGNIFICANT CHANGE UP (ref 80–94)
NRBC # BLD: 0 /100 WBCS — SIGNIFICANT CHANGE UP (ref 0–0)
PLATELET # BLD AUTO: 268 K/UL — SIGNIFICANT CHANGE UP (ref 130–400)
POTASSIUM SERPL-MCNC: 3.2 MMOL/L — LOW (ref 3.5–5)
POTASSIUM SERPL-SCNC: 3.2 MMOL/L — LOW (ref 3.5–5)
PROCALCITONIN SERPL-MCNC: 3.71 NG/ML — HIGH (ref 0.02–0.1)
PROT SERPL-MCNC: 5.9 G/DL — LOW (ref 6–8)
RBC # BLD: 3.83 M/UL — LOW (ref 4.7–6.1)
RBC # FLD: 13.5 % — SIGNIFICANT CHANGE UP (ref 11.5–14.5)
S PNEUM AG UR QL: NEGATIVE — SIGNIFICANT CHANGE UP
SODIUM SERPL-SCNC: 134 MMOL/L — LOW (ref 135–146)
SPECIMEN SOURCE: SIGNIFICANT CHANGE UP
WBC # BLD: 8.77 K/UL — SIGNIFICANT CHANGE UP (ref 4.8–10.8)
WBC # FLD AUTO: 8.77 K/UL — SIGNIFICANT CHANGE UP (ref 4.8–10.8)

## 2022-10-28 PROCEDURE — 99233 SBSQ HOSP IP/OBS HIGH 50: CPT

## 2022-10-28 RX ORDER — INSULIN GLARGINE 100 [IU]/ML
10 INJECTION, SOLUTION SUBCUTANEOUS AT BEDTIME
Refills: 0 | Status: DISCONTINUED | OUTPATIENT
Start: 2022-10-28 | End: 2022-10-31

## 2022-10-28 RX ORDER — METFORMIN HYDROCHLORIDE 850 MG/1
1 TABLET ORAL
Qty: 0 | Refills: 0 | DISCHARGE

## 2022-10-28 RX ORDER — FLUTICASONE PROPIONATE 50 MCG
1 SPRAY, SUSPENSION NASAL
Refills: 0 | Status: DISCONTINUED | OUTPATIENT
Start: 2022-10-28 | End: 2022-11-02

## 2022-10-28 RX ORDER — METOPROLOL TARTRATE 50 MG
1 TABLET ORAL
Qty: 0 | Refills: 0 | DISCHARGE

## 2022-10-28 RX ORDER — AMLODIPINE BESYLATE 2.5 MG/1
1 TABLET ORAL
Qty: 0 | Refills: 0 | DISCHARGE

## 2022-10-28 RX ORDER — EMPAGLIFLOZIN 10 MG/1
1 TABLET, FILM COATED ORAL
Qty: 0 | Refills: 0 | DISCHARGE

## 2022-10-28 RX ORDER — SITAGLIPTIN 50 MG/1
1 TABLET, FILM COATED ORAL
Qty: 0 | Refills: 0 | DISCHARGE

## 2022-10-28 RX ORDER — FLUTICASONE PROPIONATE 50 MCG
1 SPRAY, SUSPENSION NASAL
Qty: 0 | Refills: 0 | DISCHARGE

## 2022-10-28 RX ORDER — ATORVASTATIN CALCIUM 80 MG/1
1 TABLET, FILM COATED ORAL
Qty: 0 | Refills: 0 | DISCHARGE

## 2022-10-28 RX ORDER — TAMSULOSIN HYDROCHLORIDE 0.4 MG/1
0.8 CAPSULE ORAL AT BEDTIME
Refills: 0 | Status: DISCONTINUED | OUTPATIENT
Start: 2022-10-28 | End: 2022-11-02

## 2022-10-28 RX ORDER — TAMSULOSIN HYDROCHLORIDE 0.4 MG/1
1 CAPSULE ORAL
Qty: 0 | Refills: 0 | DISCHARGE

## 2022-10-28 RX ORDER — LOSARTAN POTASSIUM 100 MG/1
1 TABLET, FILM COATED ORAL
Qty: 0 | Refills: 0 | DISCHARGE

## 2022-10-28 RX ORDER — POTASSIUM CHLORIDE 20 MEQ
40 PACKET (EA) ORAL ONCE
Refills: 0 | Status: COMPLETED | OUTPATIENT
Start: 2022-10-28 | End: 2022-10-28

## 2022-10-28 RX ADMIN — HEPARIN SODIUM 5000 UNIT(S): 5000 INJECTION INTRAVENOUS; SUBCUTANEOUS at 11:46

## 2022-10-28 RX ADMIN — Medication 5 UNIT(S): at 11:44

## 2022-10-28 RX ADMIN — CEFTRIAXONE 100 MILLIGRAM(S): 500 INJECTION, POWDER, FOR SOLUTION INTRAMUSCULAR; INTRAVENOUS at 11:44

## 2022-10-28 RX ADMIN — Medication 1: at 11:45

## 2022-10-28 RX ADMIN — Medication 110 MILLIGRAM(S): at 05:27

## 2022-10-28 RX ADMIN — Medication 2: at 08:13

## 2022-10-28 RX ADMIN — HEPARIN SODIUM 5000 UNIT(S): 5000 INJECTION INTRAVENOUS; SUBCUTANEOUS at 21:20

## 2022-10-28 RX ADMIN — Medication 1 SPRAY(S): at 17:02

## 2022-10-28 RX ADMIN — TAMSULOSIN HYDROCHLORIDE 0.8 MILLIGRAM(S): 0.4 CAPSULE ORAL at 21:25

## 2022-10-28 RX ADMIN — Medication 40 MILLIEQUIVALENT(S): at 11:44

## 2022-10-28 RX ADMIN — SODIUM CHLORIDE 75 MILLILITER(S): 9 INJECTION INTRAMUSCULAR; INTRAVENOUS; SUBCUTANEOUS at 21:19

## 2022-10-28 RX ADMIN — Medication 5 UNIT(S): at 17:01

## 2022-10-28 RX ADMIN — Medication 2: at 17:01

## 2022-10-28 RX ADMIN — Medication 110 MILLIGRAM(S): at 17:02

## 2022-10-28 RX ADMIN — HEPARIN SODIUM 5000 UNIT(S): 5000 INJECTION INTRAVENOUS; SUBCUTANEOUS at 05:27

## 2022-10-28 RX ADMIN — SODIUM CHLORIDE 75 MILLILITER(S): 9 INJECTION INTRAMUSCULAR; INTRAVENOUS; SUBCUTANEOUS at 08:35

## 2022-10-28 RX ADMIN — INSULIN GLARGINE 10 UNIT(S): 100 INJECTION, SOLUTION SUBCUTANEOUS at 21:20

## 2022-10-28 RX ADMIN — Medication 5 UNIT(S): at 08:13

## 2022-10-28 RX ADMIN — Medication 650 MILLIGRAM(S): at 05:27

## 2022-10-28 NOTE — PATIENT PROFILE ADULT - SURGICAL SITE INCISION
Department of Anesthesiology  Preprocedure Note       Name:  Armando Felix   Age:  11 y.o.  :  2005                                          MRN:  [id number]         Date:  2021      Surgeon: Sourav Ha):  Lupe Multani DO    Procedure: Procedure(s):  LEFT SHOULDER ARTHROSCOPY, ANTERIOR AND POSTERIOR CAPSULORRHAPHY    Medications prior to admission:   Prior to Admission medications    Not on File       Current medications:    Current Facility-Administered Medications   Medication Dose Route Frequency Provider Last Rate Last Admin    0.9 % sodium chloride infusion   Intravenous Continuous Lupe Multani  mL/hr at 21 8875 New Bag at 21 0924    ceFAZolin (ANCEF) 2000 mg in dextrose 5 % 50 mL IVPB  2,000 mg Intravenous Once Lupe Multani DO           Allergies:  No Known Allergies    Problem List:  There is no problem list on file for this patient. Past Medical History:  History reviewed. No pertinent past medical history.     Past Surgical History:        Procedure Laterality Date    EYE SURGERY      SKIN BIOPSY      mole removed when she was in 4th grade       Social History:    Social History     Tobacco Use    Smoking status: Never Smoker    Smokeless tobacco: Never Used   Substance Use Topics    Alcohol use: Not Currently                                Counseling given: Not Answered      Vital Signs (Current):   Vitals:    21 0900   BP: 106/64   Pulse: 78   Resp: 16   Temp: 97.7 °F (36.5 °C)   TempSrc: Temporal   SpO2: 99%   Weight: 135 lb (61.2 kg)   Height: 5' 2\" (1.575 m)                                              BP Readings from Last 3 Encounters:   21 106/64 (40 %, Z = -0.24 /  46 %, Z = -0.09)*     *BP percentiles are based on the 2017 AAP Clinical Practice Guideline for girls       NPO Status: Time of last liquid consumption: 0300                        Time of last solid consumption: 2330                        Date of last liquid consumption: 05/27/21                        Date of last solid food consumption: 05/26/21    BMI:   Wt Readings from Last 3 Encounters:   05/27/21 135 lb (61.2 kg) (73 %, Z= 0.63)*     * Growth percentiles are based on CDC (Girls, 2-20 Years) data. Body mass index is 24.69 kg/m². CBC:   Lab Results   Component Value Date    WBC 7.5 05/22/2021    RBC 4.23 05/22/2021    HGB 12.4 05/22/2021    HCT 37.6 05/22/2021    MCV 88.8 05/22/2021    RDW 12.8 05/22/2021     05/22/2021       CMP:   Lab Results   Component Value Date     05/22/2021    K 4.1 05/22/2021     05/22/2021    CO2 25 05/22/2021    BUN 11 05/22/2021    CREATININE 0.6 05/22/2021    GLUCOSE 85 05/22/2021    CALCIUM 9.5 05/22/2021       POC Tests: No results for input(s): POCGLU, POCNA, POCK, POCCL, POCBUN, POCHEMO, POCHCT in the last 72 hours. Coags: No results found for: PROTIME, INR, APTT    HCG (If Applicable):   Lab Results   Component Value Date    PREGTESTUR NEGATIVE 05/27/2021        ABGs: No results found for: PHART, PO2ART, [id number], PMW6QQO, BEART, [id number]     Type & Screen (If Applicable):  No results found for: LABABO, LABRH    Drug/Infectious Status (If Applicable):  No results found for: HIV, HEPCAB    COVID-19 Screening (If Applicable):   Lab Results   Component Value Date    COVID19 Not Detected 05/22/2021           Anesthesia Evaluation    Airway: Mallampati: II  TM distance: >3 FB   Neck ROM: full  Mouth opening: > = 3 FB Dental:          Pulmonary: breath sounds clear to auscultation                             Cardiovascular:            Rhythm: regular                      Neuro/Psych:               GI/Hepatic/Renal:             Endo/Other:                     Abdominal:           Vascular:                                        Anesthesia Plan      general     ASA 1     (Left interscalene block for post op pain)  Induction: intravenous.     MIPS: Postoperative opioids intended and Prophylactic antiemetics administered. Anesthetic plan and risks discussed with patient and mother. Plan discussed with CRNA.                   Leo Gil MD   5/27/2021 no

## 2022-10-28 NOTE — PATIENT PROFILE ADULT - FALL HARM RISK - HARM RISK INTERVENTIONS

## 2022-10-28 NOTE — MEDICAL STUDENT PROGRESS NOTE(EDUCATION) - SUBJECTIVE AND OBJECTIVE BOX
STAN ELLINGTON 67y Male  MRN#: [id number]     Hospital Day: 1d    Pt is currently admitted with the primary diagnosis of CAP    SUBJECTIVE  No acute events overnight. Pt seen and examined this morning, no new complaints.                                           ----------------------------------------------------------  OBJECTIVE  PAST MEDICAL & SURGICAL HISTORY  HTN (hypertension)    HLD (hyperlipidemia)    DM (diabetes mellitus)                                              -----------------------------------------------------------  ALLERGIES:  No Known Allergies                                            ------------------------------------------------------------    HOME MEDICATIONS  Home Medications:                           MEDICATIONS:  STANDING MEDICATIONS  cefTRIAXone   IVPB 1000 milliGRAM(s) IV Intermittent every 24 hours  dextrose 5%. 1000 milliLiter(s) IV Continuous <Continuous>  dextrose 5%. 1000 milliLiter(s) IV Continuous <Continuous>  dextrose 50% Injectable 25 Gram(s) IV Push once  dextrose 50% Injectable 12.5 Gram(s) IV Push once  dextrose 50% Injectable 25 Gram(s) IV Push once  doxycycline IVPB      doxycycline IVPB 100 milliGRAM(s) IV Intermittent every 12 hours  glucagon  Injectable 1 milliGRAM(s) IntraMuscular once  heparin   Injectable 5000 Unit(s) SubCutaneous every 8 hours  insulin glargine Injectable (LANTUS) 10 Unit(s) SubCutaneous at bedtime  insulin lispro (ADMELOG) corrective regimen sliding scale   SubCutaneous three times a day before meals  insulin lispro Injectable (ADMELOG) 5 Unit(s) SubCutaneous three times a day before meals  potassium chloride   Powder 40 milliEquivalent(s) Oral once  sodium chloride 0.9%. 1000 milliLiter(s) IV Continuous <Continuous>    PRN MEDICATIONS  acetaminophen     Tablet .. 650 milliGRAM(s) Oral every 6 hours PRN  dextrose Oral Gel 15 Gram(s) Oral once PRN                                            ------------------------------------------------------------  VITAL SIGNS: Last 24 Hours  T(C): 36.7 (28 Oct 2022 07:03), Max: 38.1 (28 Oct 2022 05:15)  T(F): 98.1 (28 Oct 2022 07:03), Max: 100.6 (28 Oct 2022 05:15)  HR: 69 (28 Oct 2022 07:03) (69 - 112)  BP: 117/57 (28 Oct 2022 07:03) (117/57 - 141/65)  BP(mean): --  RR: 18 (28 Oct 2022 07:03) (18 - 19)  SpO2: 96% (28 Oct 2022 07:03) (95% - 96%)                                             --------------------------------------------------------------  LABS:                        11.4   8.77  )-----------( 268      ( 28 Oct 2022 05:59 )             32.5     10-28    134<L>  |  99  |  14  ----------------------------<  200<H>  3.2<L>   |  21  |  0.8    Ca    7.8<L>      28 Oct 2022 05:59  Mg     1.5     10-27    TPro  5.9<L>  /  Alb  3.1<L>  /  TBili  0.6  /  DBili  x   /  AST  65<H>  /  ALT  63<H>  /  AlkPhos  103  10-28        Culture - Sputum (collected 27 Oct 2022 14:40)  Source: .Sputum Sputum  Gram Stain (28 Oct 2022 09:41):    No polymorphonuclear leukocytes per low power field    Few Squamous epithelial cells per low power field    Numerous Gram Positive Rods seen per oil power field    Numerous Gram Negative Rods seen per oil power field    Culture - Blood (collected 26 Oct 2022 22:36)  Source: .Blood Blood  Preliminary Report (28 Oct 2022 03:01):    No growth to date.    Culture - Blood (collected 26 Oct 2022 22:36)  Source: .Blood Blood  Preliminary Report (28 Oct 2022 03:01):    No growth to date.                                              -------------------------------------------------------------  RADIOLOGY:                                            --------------------------------------------------------------    PHYSICAL EXAM:  GENERAL: NAD, lying in bed comfortably  HEAD:  Atraumatic, Normocephalic  EYES:  conjunctiva and sclera clear  ENT: Moist mucous membranes  NECK: Supple, No JVD  CHEST/LUNG: Unlabored respirations, +crackles RUL  HEART: Regular rate and rhythm  ABDOMEN: Soft, nontender, nondistended  EXTREMITIES:  No clubbing, cyanosis, or edema  NERVOUS SYSTEM:  A&Ox3                                               --------------------------------------------------------------    ASSESSMENT & PLAN  # Sepsis 2/2 to R lobar pneumonia   - IV Abx: rocephin and doxy  - Tmax 103.5, WBC 8.77 (10/28)   - SPo2 96% on 2L NC  - RVP neg   - MRSA neg  -Sputum Cx-numerous gram (-) and (+) rods, few squamous cells  - urine strep(-), f/u urine legionella  -f/u on Blood Cx -No growth 10/26    #transaminitis   -Monitor LFTs AST 65 ALT 63 (10/28)  -f/u acute hep panel  -d/c acetaminophen    # hypovolemic hyponatremia  - Na 131 on admission; Na 134 (10/28)  - started on NS @ 75cc/hr  - monitor      #HTN   - will keep off BP meds for borderline low BP in ED     # DM  - will start on lantus 6 and lispro 5 TID  -A1C- 8.1  - Monitor Fs    #HLD    # prerenal SANCHEZ 2/2 to hypovolemia   - Cr 1.6 on admission, currently at baseline   - Cr 0.8 in 2020   - avoid nephrotoxic drugs                                                                                ----------------------------------------------------  # DVT prophylaxis- heparin sq   # GI prophylaxis- none   # Diet- DASH/TLC  # Code status- Full  # Disposition- Home                                                                             --------------------------------------------------------     STAN ELLINGTON 67y Male  MRN#: [id number]     Hospital Day: 1d    Pt is currently admitted with the primary diagnosis of CAP    SUBJECTIVE  No acute events overnight. Pt seen and examined this morning, no new complaints.                                           ----------------------------------------------------------  OBJECTIVE  PAST MEDICAL & SURGICAL HISTORY  HTN (hypertension)    HLD (hyperlipidemia)    DM (diabetes mellitus)                                              -----------------------------------------------------------  ALLERGIES:  No Known Allergies                                            ------------------------------------------------------------    HOME MEDICATIONS  Home Medications:                           MEDICATIONS:  STANDING MEDICATIONS  cefTRIAXone   IVPB 1000 milliGRAM(s) IV Intermittent every 24 hours  dextrose 5%. 1000 milliLiter(s) IV Continuous <Continuous>  dextrose 5%. 1000 milliLiter(s) IV Continuous <Continuous>  dextrose 50% Injectable 25 Gram(s) IV Push once  dextrose 50% Injectable 12.5 Gram(s) IV Push once  dextrose 50% Injectable 25 Gram(s) IV Push once  doxycycline IVPB      doxycycline IVPB 100 milliGRAM(s) IV Intermittent every 12 hours  glucagon  Injectable 1 milliGRAM(s) IntraMuscular once  heparin   Injectable 5000 Unit(s) SubCutaneous every 8 hours  insulin glargine Injectable (LANTUS) 10 Unit(s) SubCutaneous at bedtime  insulin lispro (ADMELOG) corrective regimen sliding scale   SubCutaneous three times a day before meals  insulin lispro Injectable (ADMELOG) 5 Unit(s) SubCutaneous three times a day before meals  potassium chloride   Powder 40 milliEquivalent(s) Oral once  sodium chloride 0.9%. 1000 milliLiter(s) IV Continuous <Continuous>    PRN MEDICATIONS  acetaminophen     Tablet .. 650 milliGRAM(s) Oral every 6 hours PRN  dextrose Oral Gel 15 Gram(s) Oral once PRN                                            ------------------------------------------------------------  VITAL SIGNS: Last 24 Hours  T(C): 36.7 (28 Oct 2022 07:03), Max: 38.1 (28 Oct 2022 05:15)  T(F): 98.1 (28 Oct 2022 07:03), Max: 100.6 (28 Oct 2022 05:15)  HR: 69 (28 Oct 2022 07:03) (69 - 112)  BP: 117/57 (28 Oct 2022 07:03) (117/57 - 141/65)  BP(mean): --  RR: 18 (28 Oct 2022 07:03) (18 - 19)  SpO2: 96% (28 Oct 2022 07:03) (95% - 96%)                                             --------------------------------------------------------------  LABS:                        11.4   8.77  )-----------( 268      ( 28 Oct 2022 05:59 )             32.5     10-28    134<L>  |  99  |  14  ----------------------------<  200<H>  3.2<L>   |  21  |  0.8    Ca    7.8<L>      28 Oct 2022 05:59  Mg     1.5     10-27    TPro  5.9<L>  /  Alb  3.1<L>  /  TBili  0.6  /  DBili  x   /  AST  65<H>  /  ALT  63<H>  /  AlkPhos  103  10-28        Culture - Sputum (collected 27 Oct 2022 14:40)  Source: .Sputum Sputum  Gram Stain (28 Oct 2022 09:41):    No polymorphonuclear leukocytes per low power field    Few Squamous epithelial cells per low power field    Numerous Gram Positive Rods seen per oil power field    Numerous Gram Negative Rods seen per oil power field    Culture - Blood (collected 26 Oct 2022 22:36)  Source: .Blood Blood  Preliminary Report (28 Oct 2022 03:01):    No growth to date.    Culture - Blood (collected 26 Oct 2022 22:36)  Source: .Blood Blood  Preliminary Report (28 Oct 2022 03:01):    No growth to date.                                              -------------------------------------------------------------  RADIOLOGY:                                            --------------------------------------------------------------    PHYSICAL EXAM:  GENERAL: NAD, lying in bed comfortably  HEAD:  Atraumatic, Normocephalic  EYES:  conjunctiva and sclera clear  ENT: Moist mucous membranes  NECK: Supple, No JVD  CHEST/LUNG: Unlabored respirations, +crackles RUL  HEART: Regular rate and rhythm  ABDOMEN: Soft, nontender, nondistended  EXTREMITIES:  No clubbing, cyanosis, or edema  NERVOUS SYSTEM:  A&Ox3                                               --------------------------------------------------------------    ASSESSMENT & PLAN  # Sepsis 2/2 to R lobar pneumonia   -CXR with RUL opacity   - c/w IV Abx: rocephin and doxy  - Tmax 103.5, wbc 13 on admission  - SPo2 96% on 2L NC  - RVP neg, covid neg   - MRSA neg  -Sputum Cx-numerous gram (-) and (+) rods, few squamous cells  - urine strep(-), f/u urine legionella  -f/u on Blood Cx -No growth 10/26    #transaminitis   -Monitor LFTs AST 65 ALT 63 (10/28)  -f/u acute hep panel  -hold statin  -RUQ sono     # hypovolemic hyponatremia  - Na 131 on admission; Na 134 (10/28)  - c/w NS @ 75cc/hr  - monitor      #HTN   -takes amlodipine, losartan, HCTZ, lopressor at home  -HOLD all bp meds in setting of normotensive and septic     # DM  -hold home po meds while inpatient (metofrmin, empiglozin, sitagliptin)   - increase lantus to 10 and lispro 5 TID, ISS  -A1C- 8.1  - Monitor Fs, adjust prn     #HLD  -hold home lipitor 10 for transaminits     # prerenal SANCHEZ 2/2 to hypovolemia-resolved   - Cr 1.6 on admission, currently at baseline .8 after fluids     #BPH  -c/w home tamsulosin .8                                                                             ----------------------------------------------------  # DVT prophylaxis- heparin sq   # GI prophylaxis- none   # Diet- DASH/TLC  # Code status- Full  # Disposition- Home                                                                             --------------------------------------------------------

## 2022-10-29 LAB
ALBUMIN SERPL ELPH-MCNC: 2.7 G/DL — LOW (ref 3.5–5.2)
ALP SERPL-CCNC: 131 U/L — HIGH (ref 30–115)
ALT FLD-CCNC: 99 U/L — HIGH (ref 0–41)
ANION GAP SERPL CALC-SCNC: 11 MMOL/L — SIGNIFICANT CHANGE UP (ref 7–14)
AST SERPL-CCNC: 106 U/L — HIGH (ref 0–41)
BASOPHILS # BLD AUTO: 0 K/UL — SIGNIFICANT CHANGE UP (ref 0–0.2)
BASOPHILS NFR BLD AUTO: 0 % — SIGNIFICANT CHANGE UP (ref 0–1)
BILIRUB SERPL-MCNC: 0.5 MG/DL — SIGNIFICANT CHANGE UP (ref 0.2–1.2)
BUN SERPL-MCNC: 12 MG/DL — SIGNIFICANT CHANGE UP (ref 10–20)
CALCIUM SERPL-MCNC: 7.6 MG/DL — LOW (ref 8.4–10.5)
CHLORIDE SERPL-SCNC: 109 MMOL/L — SIGNIFICANT CHANGE UP (ref 98–110)
CO2 SERPL-SCNC: 23 MMOL/L — SIGNIFICANT CHANGE UP (ref 17–32)
CREAT SERPL-MCNC: 0.7 MG/DL — SIGNIFICANT CHANGE UP (ref 0.7–1.5)
EGFR: 100 ML/MIN/1.73M2 — SIGNIFICANT CHANGE UP
EOSINOPHIL # BLD AUTO: 0 K/UL — SIGNIFICANT CHANGE UP (ref 0–0.7)
EOSINOPHIL NFR BLD AUTO: 0 % — SIGNIFICANT CHANGE UP (ref 0–8)
GLUCOSE BLDC GLUCOMTR-MCNC: 185 MG/DL — HIGH (ref 70–99)
GLUCOSE BLDC GLUCOMTR-MCNC: 272 MG/DL — HIGH (ref 70–99)
GLUCOSE BLDC GLUCOMTR-MCNC: 317 MG/DL — HIGH (ref 70–99)
GLUCOSE BLDC GLUCOMTR-MCNC: 350 MG/DL — HIGH (ref 70–99)
GLUCOSE SERPL-MCNC: 185 MG/DL — HIGH (ref 70–99)
HCT VFR BLD CALC: 31.2 % — LOW (ref 42–52)
HGB BLD-MCNC: 10.6 G/DL — LOW (ref 14–18)
LYMPHOCYTES # BLD AUTO: 0.59 K/UL — LOW (ref 1.2–3.4)
LYMPHOCYTES # BLD AUTO: 7.8 % — LOW (ref 20.5–51.1)
MAGNESIUM SERPL-MCNC: 1.7 MG/DL — LOW (ref 1.8–2.4)
MANUAL SMEAR VERIFICATION: SIGNIFICANT CHANGE UP
MCHC RBC-ENTMCNC: 29.4 PG — SIGNIFICANT CHANGE UP (ref 27–31)
MCHC RBC-ENTMCNC: 34 G/DL — SIGNIFICANT CHANGE UP (ref 32–37)
MCV RBC AUTO: 86.7 FL — SIGNIFICANT CHANGE UP (ref 80–94)
METAMYELOCYTES # FLD: 1.7 % — HIGH (ref 0–0)
MONOCYTES # BLD AUTO: 0.46 K/UL — SIGNIFICANT CHANGE UP (ref 0.1–0.6)
MONOCYTES NFR BLD AUTO: 6.1 % — SIGNIFICANT CHANGE UP (ref 1.7–9.3)
MYELOCYTES NFR BLD: 1.7 % — HIGH (ref 0–0)
NEUTROPHILS # BLD AUTO: 6.16 K/UL — SIGNIFICANT CHANGE UP (ref 1.4–6.5)
NEUTROPHILS NFR BLD AUTO: 81.8 % — HIGH (ref 42.2–75.2)
NRBC # BLD: 2 /100 — HIGH (ref 0–0)
NRBC # BLD: SIGNIFICANT CHANGE UP /100 WBCS (ref 0–0)
OVALOCYTES BLD QL SMEAR: SLIGHT — SIGNIFICANT CHANGE UP
PLAT MORPH BLD: NORMAL — SIGNIFICANT CHANGE UP
PLATELET # BLD AUTO: 270 K/UL — SIGNIFICANT CHANGE UP (ref 130–400)
POIKILOCYTOSIS BLD QL AUTO: SLIGHT — SIGNIFICANT CHANGE UP
POTASSIUM SERPL-MCNC: 3.8 MMOL/L — SIGNIFICANT CHANGE UP (ref 3.5–5)
POTASSIUM SERPL-SCNC: 3.8 MMOL/L — SIGNIFICANT CHANGE UP (ref 3.5–5)
PROT SERPL-MCNC: 5.1 G/DL — LOW (ref 6–8)
RBC # BLD: 3.6 M/UL — LOW (ref 4.7–6.1)
RBC # FLD: 13.3 % — SIGNIFICANT CHANGE UP (ref 11.5–14.5)
RBC BLD AUTO: ABNORMAL
SODIUM SERPL-SCNC: 143 MMOL/L — SIGNIFICANT CHANGE UP (ref 135–146)
VARIANT LYMPHS # BLD: 0.9 % — SIGNIFICANT CHANGE UP (ref 0–5)
WBC # BLD: 7.53 K/UL — SIGNIFICANT CHANGE UP (ref 4.8–10.8)
WBC # FLD AUTO: 7.53 K/UL — SIGNIFICANT CHANGE UP (ref 4.8–10.8)

## 2022-10-29 PROCEDURE — 76705 ECHO EXAM OF ABDOMEN: CPT | Mod: 26

## 2022-10-29 PROCEDURE — 99233 SBSQ HOSP IP/OBS HIGH 50: CPT

## 2022-10-29 RX ORDER — CEFEPIME 1 G/1
1000 INJECTION, POWDER, FOR SOLUTION INTRAMUSCULAR; INTRAVENOUS EVERY 12 HOURS
Refills: 0 | Status: DISCONTINUED | OUTPATIENT
Start: 2022-10-29 | End: 2022-10-31

## 2022-10-29 RX ORDER — CEFEPIME 1 G/1
INJECTION, POWDER, FOR SOLUTION INTRAMUSCULAR; INTRAVENOUS
Refills: 0 | Status: DISCONTINUED | OUTPATIENT
Start: 2022-10-29 | End: 2022-10-31

## 2022-10-29 RX ORDER — CEFEPIME 1 G/1
1000 INJECTION, POWDER, FOR SOLUTION INTRAMUSCULAR; INTRAVENOUS ONCE
Refills: 0 | Status: COMPLETED | OUTPATIENT
Start: 2022-10-29 | End: 2022-10-29

## 2022-10-29 RX ADMIN — Medication 1: at 08:39

## 2022-10-29 RX ADMIN — Medication 1 SPRAY(S): at 05:10

## 2022-10-29 RX ADMIN — CEFEPIME 100 MILLIGRAM(S): 1 INJECTION, POWDER, FOR SOLUTION INTRAMUSCULAR; INTRAVENOUS at 12:40

## 2022-10-29 RX ADMIN — Medication 110 MILLIGRAM(S): at 17:28

## 2022-10-29 RX ADMIN — INSULIN GLARGINE 10 UNIT(S): 100 INJECTION, SOLUTION SUBCUTANEOUS at 22:15

## 2022-10-29 RX ADMIN — HEPARIN SODIUM 5000 UNIT(S): 5000 INJECTION INTRAVENOUS; SUBCUTANEOUS at 14:15

## 2022-10-29 RX ADMIN — Medication 110 MILLIGRAM(S): at 05:09

## 2022-10-29 RX ADMIN — HEPARIN SODIUM 5000 UNIT(S): 5000 INJECTION INTRAVENOUS; SUBCUTANEOUS at 05:10

## 2022-10-29 RX ADMIN — CEFEPIME 100 MILLIGRAM(S): 1 INJECTION, POWDER, FOR SOLUTION INTRAMUSCULAR; INTRAVENOUS at 17:30

## 2022-10-29 RX ADMIN — Medication 1 SPRAY(S): at 17:27

## 2022-10-29 RX ADMIN — Medication 5 UNIT(S): at 08:39

## 2022-10-29 RX ADMIN — Medication 5 UNIT(S): at 17:27

## 2022-10-29 RX ADMIN — Medication 3: at 12:39

## 2022-10-29 RX ADMIN — HEPARIN SODIUM 5000 UNIT(S): 5000 INJECTION INTRAVENOUS; SUBCUTANEOUS at 21:17

## 2022-10-29 RX ADMIN — Medication 5 UNIT(S): at 12:39

## 2022-10-29 RX ADMIN — Medication 4: at 17:27

## 2022-10-29 RX ADMIN — TAMSULOSIN HYDROCHLORIDE 0.8 MILLIGRAM(S): 0.4 CAPSULE ORAL at 21:16

## 2022-10-30 LAB
ALBUMIN SERPL ELPH-MCNC: 3 G/DL — LOW (ref 3.5–5.2)
ALP SERPL-CCNC: 182 U/L — HIGH (ref 30–115)
ALT FLD-CCNC: 137 U/L — HIGH (ref 0–41)
ANION GAP SERPL CALC-SCNC: 12 MMOL/L — SIGNIFICANT CHANGE UP (ref 7–14)
AST SERPL-CCNC: 143 U/L — HIGH (ref 0–41)
BASOPHILS # BLD AUTO: 0.02 K/UL — SIGNIFICANT CHANGE UP (ref 0–0.2)
BASOPHILS NFR BLD AUTO: 0.2 % — SIGNIFICANT CHANGE UP (ref 0–1)
BILIRUB SERPL-MCNC: 0.5 MG/DL — SIGNIFICANT CHANGE UP (ref 0.2–1.2)
BLD GP AB SCN SERPL QL: SIGNIFICANT CHANGE UP
BUN SERPL-MCNC: 25 MG/DL — HIGH (ref 10–20)
CALCIUM SERPL-MCNC: 8.1 MG/DL — LOW (ref 8.4–10.5)
CHLORIDE SERPL-SCNC: 105 MMOL/L — SIGNIFICANT CHANGE UP (ref 98–110)
CO2 SERPL-SCNC: 23 MMOL/L — SIGNIFICANT CHANGE UP (ref 17–32)
CREAT SERPL-MCNC: 0.7 MG/DL — SIGNIFICANT CHANGE UP (ref 0.7–1.5)
CULTURE RESULTS: SIGNIFICANT CHANGE UP
EGFR: 100 ML/MIN/1.73M2 — SIGNIFICANT CHANGE UP
EOSINOPHIL # BLD AUTO: 0.15 K/UL — SIGNIFICANT CHANGE UP (ref 0–0.7)
EOSINOPHIL NFR BLD AUTO: 1.9 % — SIGNIFICANT CHANGE UP (ref 0–8)
GLUCOSE BLDC GLUCOMTR-MCNC: 167 MG/DL — HIGH (ref 70–99)
GLUCOSE BLDC GLUCOMTR-MCNC: 190 MG/DL — HIGH (ref 70–99)
GLUCOSE BLDC GLUCOMTR-MCNC: 304 MG/DL — HIGH (ref 70–99)
GLUCOSE BLDC GLUCOMTR-MCNC: 310 MG/DL — HIGH (ref 70–99)
GLUCOSE SERPL-MCNC: 300 MG/DL — HIGH (ref 70–99)
HCT VFR BLD CALC: 22.4 % — LOW (ref 42–52)
HGB BLD-MCNC: 7.8 G/DL — LOW (ref 14–18)
IMM GRANULOCYTES NFR BLD AUTO: 5.6 % — HIGH (ref 0.1–0.3)
LYMPHOCYTES # BLD AUTO: 1.26 K/UL — SIGNIFICANT CHANGE UP (ref 1.2–3.4)
LYMPHOCYTES # BLD AUTO: 15.6 % — LOW (ref 20.5–51.1)
MAGNESIUM SERPL-MCNC: 1.7 MG/DL — LOW (ref 1.8–2.4)
MCHC RBC-ENTMCNC: 30 PG — SIGNIFICANT CHANGE UP (ref 27–31)
MCHC RBC-ENTMCNC: 34.8 G/DL — SIGNIFICANT CHANGE UP (ref 32–37)
MCV RBC AUTO: 86.2 FL — SIGNIFICANT CHANGE UP (ref 80–94)
MONOCYTES # BLD AUTO: 0.65 K/UL — HIGH (ref 0.1–0.6)
MONOCYTES NFR BLD AUTO: 8.1 % — SIGNIFICANT CHANGE UP (ref 1.7–9.3)
NEUTROPHILS # BLD AUTO: 5.53 K/UL — SIGNIFICANT CHANGE UP (ref 1.4–6.5)
NEUTROPHILS NFR BLD AUTO: 68.6 % — SIGNIFICANT CHANGE UP (ref 42.2–75.2)
NRBC # BLD: 0 /100 WBCS — SIGNIFICANT CHANGE UP (ref 0–0)
PLATELET # BLD AUTO: 366 K/UL — SIGNIFICANT CHANGE UP (ref 130–400)
POTASSIUM SERPL-MCNC: 3.8 MMOL/L — SIGNIFICANT CHANGE UP (ref 3.5–5)
POTASSIUM SERPL-SCNC: 3.8 MMOL/L — SIGNIFICANT CHANGE UP (ref 3.5–5)
PROT SERPL-MCNC: 5.6 G/DL — LOW (ref 6–8)
RBC # BLD: 2.6 M/UL — LOW (ref 4.7–6.1)
RBC # FLD: 13.5 % — SIGNIFICANT CHANGE UP (ref 11.5–14.5)
SODIUM SERPL-SCNC: 140 MMOL/L — SIGNIFICANT CHANGE UP (ref 135–146)
SPECIMEN SOURCE: SIGNIFICANT CHANGE UP
WBC # BLD: 8.06 K/UL — SIGNIFICANT CHANGE UP (ref 4.8–10.8)
WBC # FLD AUTO: 8.06 K/UL — SIGNIFICANT CHANGE UP (ref 4.8–10.8)

## 2022-10-30 PROCEDURE — 99233 SBSQ HOSP IP/OBS HIGH 50: CPT

## 2022-10-30 RX ORDER — MAGNESIUM OXIDE 400 MG ORAL TABLET 241.3 MG
400 TABLET ORAL
Refills: 0 | Status: DISCONTINUED | OUTPATIENT
Start: 2022-10-30 | End: 2022-11-02

## 2022-10-30 RX ADMIN — Medication 110 MILLIGRAM(S): at 05:17

## 2022-10-30 RX ADMIN — Medication 5 UNIT(S): at 16:53

## 2022-10-30 RX ADMIN — CEFEPIME 100 MILLIGRAM(S): 1 INJECTION, POWDER, FOR SOLUTION INTRAMUSCULAR; INTRAVENOUS at 05:17

## 2022-10-30 RX ADMIN — HEPARIN SODIUM 5000 UNIT(S): 5000 INJECTION INTRAVENOUS; SUBCUTANEOUS at 21:10

## 2022-10-30 RX ADMIN — Medication 5 UNIT(S): at 12:08

## 2022-10-30 RX ADMIN — HEPARIN SODIUM 5000 UNIT(S): 5000 INJECTION INTRAVENOUS; SUBCUTANEOUS at 14:16

## 2022-10-30 RX ADMIN — HEPARIN SODIUM 5000 UNIT(S): 5000 INJECTION INTRAVENOUS; SUBCUTANEOUS at 05:17

## 2022-10-30 RX ADMIN — TAMSULOSIN HYDROCHLORIDE 0.8 MILLIGRAM(S): 0.4 CAPSULE ORAL at 21:11

## 2022-10-30 RX ADMIN — MAGNESIUM OXIDE 400 MG ORAL TABLET 400 MILLIGRAM(S): 241.3 TABLET ORAL at 21:10

## 2022-10-30 RX ADMIN — CEFEPIME 100 MILLIGRAM(S): 1 INJECTION, POWDER, FOR SOLUTION INTRAMUSCULAR; INTRAVENOUS at 17:17

## 2022-10-30 RX ADMIN — Medication 4: at 12:08

## 2022-10-30 RX ADMIN — Medication 1: at 16:53

## 2022-10-30 RX ADMIN — MAGNESIUM OXIDE 400 MG ORAL TABLET 400 MILLIGRAM(S): 241.3 TABLET ORAL at 12:09

## 2022-10-30 RX ADMIN — Medication 1 SPRAY(S): at 05:17

## 2022-10-30 RX ADMIN — Medication 110 MILLIGRAM(S): at 17:17

## 2022-10-30 RX ADMIN — Medication 4: at 08:16

## 2022-10-30 RX ADMIN — SODIUM CHLORIDE 75 MILLILITER(S): 9 INJECTION INTRAMUSCULAR; INTRAVENOUS; SUBCUTANEOUS at 05:17

## 2022-10-30 RX ADMIN — Medication 1 SPRAY(S): at 17:18

## 2022-10-30 RX ADMIN — INSULIN GLARGINE 10 UNIT(S): 100 INJECTION, SOLUTION SUBCUTANEOUS at 22:46

## 2022-10-30 RX ADMIN — Medication 5 UNIT(S): at 08:17

## 2022-10-31 LAB
ALBUMIN SERPL ELPH-MCNC: 3.1 G/DL — LOW (ref 3.5–5.2)
ALP SERPL-CCNC: 195 U/L — HIGH (ref 30–115)
ALT FLD-CCNC: 177 U/L — HIGH (ref 0–41)
ANION GAP SERPL CALC-SCNC: 10 MMOL/L — SIGNIFICANT CHANGE UP (ref 7–14)
ANION GAP SERPL CALC-SCNC: 9 MMOL/L — SIGNIFICANT CHANGE UP (ref 7–14)
APTT BLD: 25.3 SEC — LOW (ref 27–39.2)
AST SERPL-CCNC: 135 U/L — HIGH (ref 0–41)
BASOPHILS # BLD AUTO: 0.02 K/UL — SIGNIFICANT CHANGE UP (ref 0–0.2)
BASOPHILS NFR BLD AUTO: 0.3 % — SIGNIFICANT CHANGE UP (ref 0–1)
BILIRUB SERPL-MCNC: 0.6 MG/DL — SIGNIFICANT CHANGE UP (ref 0.2–1.2)
BLD GP AB SCN SERPL QL: SIGNIFICANT CHANGE UP
BLD GP AB SCN SERPL QL: SIGNIFICANT CHANGE UP
BUN SERPL-MCNC: 11 MG/DL — SIGNIFICANT CHANGE UP (ref 10–20)
BUN SERPL-MCNC: 9 MG/DL — LOW (ref 10–20)
CALCIUM SERPL-MCNC: 8.1 MG/DL — LOW (ref 8.4–10.4)
CALCIUM SERPL-MCNC: 8.2 MG/DL — LOW (ref 8.4–10.5)
CHLORIDE SERPL-SCNC: 101 MMOL/L — SIGNIFICANT CHANGE UP (ref 98–110)
CHLORIDE SERPL-SCNC: 104 MMOL/L — SIGNIFICANT CHANGE UP (ref 98–110)
CO2 SERPL-SCNC: 23 MMOL/L — SIGNIFICANT CHANGE UP (ref 17–32)
CO2 SERPL-SCNC: 25 MMOL/L — SIGNIFICANT CHANGE UP (ref 17–32)
CREAT SERPL-MCNC: 0.6 MG/DL — LOW (ref 0.7–1.5)
CREAT SERPL-MCNC: 0.6 MG/DL — LOW (ref 0.7–1.5)
EGFR: 105 ML/MIN/1.73M2 — SIGNIFICANT CHANGE UP
EGFR: 105 ML/MIN/1.73M2 — SIGNIFICANT CHANGE UP
EOSINOPHIL # BLD AUTO: 0.11 K/UL — SIGNIFICANT CHANGE UP (ref 0–0.7)
EOSINOPHIL NFR BLD AUTO: 1.6 % — SIGNIFICANT CHANGE UP (ref 0–8)
GLUCOSE BLDC GLUCOMTR-MCNC: 120 MG/DL — HIGH (ref 70–99)
GLUCOSE BLDC GLUCOMTR-MCNC: 178 MG/DL — HIGH (ref 70–99)
GLUCOSE BLDC GLUCOMTR-MCNC: 184 MG/DL — HIGH (ref 70–99)
GLUCOSE BLDC GLUCOMTR-MCNC: 243 MG/DL — HIGH (ref 70–99)
GLUCOSE BLDC GLUCOMTR-MCNC: 318 MG/DL — HIGH (ref 70–99)
GLUCOSE SERPL-MCNC: 144 MG/DL — HIGH (ref 70–99)
GLUCOSE SERPL-MCNC: 167 MG/DL — HIGH (ref 70–99)
HCT VFR BLD CALC: 17.2 % — LOW (ref 42–52)
HCT VFR BLD CALC: 25.7 % — LOW (ref 42–52)
HCT VFR BLD CALC: 28.5 % — LOW (ref 42–52)
HGB BLD-MCNC: 6.1 G/DL — CRITICAL LOW (ref 14–18)
HGB BLD-MCNC: 8.9 G/DL — LOW (ref 14–18)
HGB BLD-MCNC: 9.8 G/DL — LOW (ref 14–18)
IMM GRANULOCYTES NFR BLD AUTO: 8.1 % — HIGH (ref 0.1–0.3)
INR BLD: 0.97 RATIO — SIGNIFICANT CHANGE UP (ref 0.65–1.3)
IRON SATN MFR SERPL: 38 % — SIGNIFICANT CHANGE UP (ref 15–50)
IRON SATN MFR SERPL: 80 UG/DL — SIGNIFICANT CHANGE UP (ref 35–150)
LYMPHOCYTES # BLD AUTO: 0.97 K/UL — LOW (ref 1.2–3.4)
LYMPHOCYTES # BLD AUTO: 14 % — LOW (ref 20.5–51.1)
M PNEUMO IGM SER-ACNC: 0.86 INDEX — SIGNIFICANT CHANGE UP (ref 0–0.9)
MAGNESIUM SERPL-MCNC: 1.8 MG/DL — SIGNIFICANT CHANGE UP (ref 1.8–2.4)
MCHC RBC-ENTMCNC: 29.6 PG — SIGNIFICANT CHANGE UP (ref 27–31)
MCHC RBC-ENTMCNC: 29.6 PG — SIGNIFICANT CHANGE UP (ref 27–31)
MCHC RBC-ENTMCNC: 30.7 PG — SIGNIFICANT CHANGE UP (ref 27–31)
MCHC RBC-ENTMCNC: 34.4 G/DL — SIGNIFICANT CHANGE UP (ref 32–37)
MCHC RBC-ENTMCNC: 34.6 G/DL — SIGNIFICANT CHANGE UP (ref 32–37)
MCHC RBC-ENTMCNC: 35.5 G/DL — SIGNIFICANT CHANGE UP (ref 32–37)
MCV RBC AUTO: 85.4 FL — SIGNIFICANT CHANGE UP (ref 80–94)
MCV RBC AUTO: 86.1 FL — SIGNIFICANT CHANGE UP (ref 80–94)
MCV RBC AUTO: 86.4 FL — SIGNIFICANT CHANGE UP (ref 80–94)
MONOCYTES # BLD AUTO: 0.53 K/UL — SIGNIFICANT CHANGE UP (ref 0.1–0.6)
MONOCYTES NFR BLD AUTO: 7.6 % — SIGNIFICANT CHANGE UP (ref 1.7–9.3)
MYCOPLASMA AG SPEC QL: NEGATIVE — SIGNIFICANT CHANGE UP
NEUTROPHILS # BLD AUTO: 4.74 K/UL — SIGNIFICANT CHANGE UP (ref 1.4–6.5)
NEUTROPHILS NFR BLD AUTO: 68.4 % — SIGNIFICANT CHANGE UP (ref 42.2–75.2)
NRBC # BLD: 0 /100 WBCS — SIGNIFICANT CHANGE UP (ref 0–0)
NRBC # BLD: 0 /100 WBCS — SIGNIFICANT CHANGE UP (ref 0–0)
NRBC # BLD: 1 /100 WBCS — HIGH (ref 0–0)
PLATELET # BLD AUTO: 350 K/UL — SIGNIFICANT CHANGE UP (ref 130–400)
PLATELET # BLD AUTO: 418 K/UL — HIGH (ref 130–400)
PLATELET # BLD AUTO: 457 K/UL — HIGH (ref 130–400)
POTASSIUM SERPL-MCNC: 3.8 MMOL/L — SIGNIFICANT CHANGE UP (ref 3.5–5)
POTASSIUM SERPL-MCNC: 4 MMOL/L — SIGNIFICANT CHANGE UP (ref 3.5–5)
POTASSIUM SERPL-SCNC: 3.8 MMOL/L — SIGNIFICANT CHANGE UP (ref 3.5–5)
POTASSIUM SERPL-SCNC: 4 MMOL/L — SIGNIFICANT CHANGE UP (ref 3.5–5)
PROT SERPL-MCNC: 5.7 G/DL — LOW (ref 6–8)
PROTHROM AB SERPL-ACNC: 11.1 SEC — SIGNIFICANT CHANGE UP (ref 9.95–12.87)
RBC # BLD: 1.99 M/UL — LOW (ref 4.7–6.1)
RBC # BLD: 3.01 M/UL — LOW (ref 4.7–6.1)
RBC # BLD: 3.31 M/UL — LOW (ref 4.7–6.1)
RBC # FLD: 13.4 % — SIGNIFICANT CHANGE UP (ref 11.5–14.5)
RBC # FLD: 13.6 % — SIGNIFICANT CHANGE UP (ref 11.5–14.5)
RBC # FLD: 13.7 % — SIGNIFICANT CHANGE UP (ref 11.5–14.5)
SODIUM SERPL-SCNC: 135 MMOL/L — SIGNIFICANT CHANGE UP (ref 135–146)
SODIUM SERPL-SCNC: 137 MMOL/L — SIGNIFICANT CHANGE UP (ref 135–146)
TIBC SERPL-MCNC: 208 UG/DL — LOW (ref 220–430)
TRANSFERRIN SERPL-MCNC: 185 MG/DL — LOW (ref 200–360)
UIBC SERPL-MCNC: 128 UG/DL — SIGNIFICANT CHANGE UP (ref 110–370)
WBC # BLD: 10.48 K/UL — SIGNIFICANT CHANGE UP (ref 4.8–10.8)
WBC # BLD: 11.39 K/UL — HIGH (ref 4.8–10.8)
WBC # BLD: 6.93 K/UL — SIGNIFICANT CHANGE UP (ref 4.8–10.8)
WBC # FLD AUTO: 10.48 K/UL — SIGNIFICANT CHANGE UP (ref 4.8–10.8)
WBC # FLD AUTO: 11.39 K/UL — HIGH (ref 4.8–10.8)
WBC # FLD AUTO: 6.93 K/UL — SIGNIFICANT CHANGE UP (ref 4.8–10.8)

## 2022-10-31 PROCEDURE — 99233 SBSQ HOSP IP/OBS HIGH 50: CPT

## 2022-10-31 PROCEDURE — 74174 CTA ABD&PLVS W/CONTRAST: CPT | Mod: 26

## 2022-10-31 PROCEDURE — 99223 1ST HOSP IP/OBS HIGH 75: CPT

## 2022-10-31 RX ORDER — PANTOPRAZOLE SODIUM 20 MG/1
40 TABLET, DELAYED RELEASE ORAL
Refills: 0 | Status: DISCONTINUED | OUTPATIENT
Start: 2022-10-31 | End: 2022-10-31

## 2022-10-31 RX ORDER — INSULIN GLARGINE 100 [IU]/ML
15 INJECTION, SOLUTION SUBCUTANEOUS AT BEDTIME
Refills: 0 | Status: DISCONTINUED | OUTPATIENT
Start: 2022-10-31 | End: 2022-11-02

## 2022-10-31 RX ORDER — PANTOPRAZOLE SODIUM 20 MG/1
40 TABLET, DELAYED RELEASE ORAL
Refills: 0 | Status: DISCONTINUED | OUTPATIENT
Start: 2022-10-31 | End: 2022-11-01

## 2022-10-31 RX ORDER — SOD SULF/SODIUM/NAHCO3/KCL/PEG
4000 SOLUTION, RECONSTITUTED, ORAL ORAL ONCE
Refills: 0 | Status: COMPLETED | OUTPATIENT
Start: 2022-10-31 | End: 2022-10-31

## 2022-10-31 RX ORDER — INSULIN LISPRO 100/ML
7 VIAL (ML) SUBCUTANEOUS
Refills: 0 | Status: DISCONTINUED | OUTPATIENT
Start: 2022-10-31 | End: 2022-11-02

## 2022-10-31 RX ORDER — AZITHROMYCIN 500 MG/1
500 TABLET, FILM COATED ORAL EVERY 24 HOURS
Refills: 0 | Status: DISCONTINUED | OUTPATIENT
Start: 2022-10-31 | End: 2022-11-02

## 2022-10-31 RX ADMIN — Medication 7 UNIT(S): at 11:51

## 2022-10-31 RX ADMIN — TAMSULOSIN HYDROCHLORIDE 0.8 MILLIGRAM(S): 0.4 CAPSULE ORAL at 21:54

## 2022-10-31 RX ADMIN — Medication 4000 MILLILITER(S): at 18:21

## 2022-10-31 RX ADMIN — HEPARIN SODIUM 5000 UNIT(S): 5000 INJECTION INTRAVENOUS; SUBCUTANEOUS at 05:21

## 2022-10-31 RX ADMIN — MAGNESIUM OXIDE 400 MG ORAL TABLET 400 MILLIGRAM(S): 241.3 TABLET ORAL at 09:35

## 2022-10-31 RX ADMIN — PANTOPRAZOLE SODIUM 40 MILLIGRAM(S): 20 TABLET, DELAYED RELEASE ORAL at 17:28

## 2022-10-31 RX ADMIN — MAGNESIUM OXIDE 400 MG ORAL TABLET 400 MILLIGRAM(S): 241.3 TABLET ORAL at 17:28

## 2022-10-31 RX ADMIN — Medication 2: at 09:32

## 2022-10-31 RX ADMIN — Medication 110 MILLIGRAM(S): at 05:20

## 2022-10-31 RX ADMIN — MAGNESIUM OXIDE 400 MG ORAL TABLET 400 MILLIGRAM(S): 241.3 TABLET ORAL at 11:55

## 2022-10-31 RX ADMIN — Medication 1 SPRAY(S): at 17:28

## 2022-10-31 RX ADMIN — PANTOPRAZOLE SODIUM 40 MILLIGRAM(S): 20 TABLET, DELAYED RELEASE ORAL at 11:55

## 2022-10-31 RX ADMIN — INSULIN GLARGINE 15 UNIT(S): 100 INJECTION, SOLUTION SUBCUTANEOUS at 22:05

## 2022-10-31 RX ADMIN — PANTOPRAZOLE SODIUM 40 MILLIGRAM(S): 20 TABLET, DELAYED RELEASE ORAL at 09:35

## 2022-10-31 RX ADMIN — Medication 20 MILLIGRAM(S): at 21:54

## 2022-10-31 RX ADMIN — AZITHROMYCIN 255 MILLIGRAM(S): 500 TABLET, FILM COATED ORAL at 17:28

## 2022-10-31 RX ADMIN — CEFEPIME 100 MILLIGRAM(S): 1 INJECTION, POWDER, FOR SOLUTION INTRAMUSCULAR; INTRAVENOUS at 05:20

## 2022-10-31 RX ADMIN — Medication 5 UNIT(S): at 09:32

## 2022-10-31 RX ADMIN — Medication 1 SPRAY(S): at 05:20

## 2022-10-31 RX ADMIN — Medication 4: at 11:51

## 2022-10-31 NOTE — CONSULT NOTE ADULT - SUBJECTIVE AND OBJECTIVE BOX
STAN ELLINGTON  67y, Male  Allergy: No Known Allergies      CHIEF COMPLAINT: fever + cough (31 Oct 2022 12:19)      LOS  4d    HPI:  66 yo male, A[place]i speaking,  PMhx of HTN, HLD, DM presenting for fever and productive cough. Pt with productive cough  green sputum for about a week. Today, started having fever which prompted him to go his PMD earlier today where he had a CXR that showed a PNA for which he was advised to come to ED for evaluation. Denies sick contacts or recent travel. Pt denies chills, weakness, chest pain, sob, abd pain/n/v/d, or urinary sxs.      ED course:   VS: Tmax 103.5, /61, , SpO2 95% on RA     LABS:                          12.3   13.77 )-----------( 242      ( 26 Oct 2022 22:36 )             34.9     10-26    130<L>  |  92<L>  |  29<H>  ----------------------------<  328<H>  3.6   |  23  |  1.6<H>    Ca    8.3<L>      26 Oct 2022 22:36    TPro  6.2  /  Alb  3.3<L>  /  TBili  0.5  /  DBili  x   /  AST  57<H>  /  ALT  58<H>  /  AlkPhos  96  10-26    Imaging:   CXR:R lobar pneumonia          (27 Oct 2022 03:41)      INFECTIOUS DISEASE HISTORY:  History as above.  CXR 10/26 with RUL lobe consolidation  Urine Legionella is positive.       PAST MEDICAL & SURGICAL HISTORY:  HTN (hypertension)      HLD (hyperlipidemia)      DM (diabetes mellitus)          FAMILY HISTORY  Family history reviewed and non-contributory        SOCIAL HISTORY  Social History:        ROS  General: Denies rigors, nightsweats  HEENT: Denies headache, rhinorrhea, sore throat, eye pain  CV: Denies CP, palpitations  PULM: Denies wheezing, hemoptysis  GI: Denies hematemesis, hematochezia, melena  : Denies discharge, hematuria  MSK: Denies arthralgias, myalgias  SKIN: Denies rash, lesions  NEURO: Denies paresthesias, weakness  PSYCH: Denies depression, anxiety    VITALS:  T(F): 98.1, Max: 98.8 (10-31-22 @ 06:11)  HR: 78  BP: 150/70  RR: 17Vital Signs Last 24 Hrs  T(C): 36.7 (31 Oct 2022 13:19), Max: 37.1 (31 Oct 2022 06:11)  T(F): 98.1 (31 Oct 2022 13:19), Max: 98.8 (31 Oct 2022 06:11)  HR: 78 (31 Oct 2022 13:19) (74 - 94)  BP: 150/70 (31 Oct 2022 13:19) (113/62 - 150/70)  BP(mean): 86 (31 Oct 2022 05:26) (84 - 86)  RR: 17 (31 Oct 2022 13:19) (16 - 18)  SpO2: 96% (31 Oct 2022 05:26) (96% - 96%)    Parameters below as of 31 Oct 2022 05:26  Patient On (Oxygen Delivery Method): room air        PHYSICAL EXAM:  Gen: NAD, resting in bed  HEENT: Normocephalic, atraumatic  Neck: supple, no lymphadenopathy  CV: Regular rate & regular rhythm  Lungs: decreased BS at bases, no fremitus  Abdomen: Soft, BS present  Ext: Warm, well perfused  Neuro: non focal, awake  Skin: no rash, no erythema  Lines: no phlebitis    TESTS & MEASUREMENTS:                        6.1    6.93  )-----------( 350      ( 30 Oct 2022 23:14 )             17.2     10-30    140  |  105  |  25<H>  ----------------------------<  300<H>  3.8   |  23  |  0.7    Ca    8.1<L>      30 Oct 2022 08:11  Mg     1.7     10-30    TPro  5.6<L>  /  Alb  3.0<L>  /  TBili  0.5  /  DBili  x   /  AST  143<H>  /  ALT  137<H>  /  AlkPhos  182<H>  10-30      LIVER FUNCTIONS - ( 30 Oct 2022 08:11 )  Alb: 3.0 g/dL / Pro: 5.6 g/dL / ALK PHOS: 182 U/L / ALT: 137 U/L / AST: 143 U/L / GGT: x               Culture - Sputum (collected 10-27-22 @ 14:40)  Source: .Sputum Sputum  Gram Stain (10-28-22 @ 09:41):    No polymorphonuclear leukocytes per low power field    Few Squamous epithelial cells per low power field    Numerous Gram Positive Rods seen per oil power field    Numerous Gram Negative Rods seen per oil power field  Final Report (10-30-22 @ 12:01):    Normal Respiratory Lamar present    Culture - Blood (collected 10-26-22 @ 22:36)  Source: .Blood Blood  Preliminary Report (10-28-22 @ 03:01):    No growth to date.    Culture - Blood (collected 10-26-22 @ 22:36)  Source: .Blood Blood  Preliminary Report (10-28-22 @ 03:01):    No growth to date.        Blood Gas Venous - Lactate: 1.40 mmol/L (10-27-22 @ 00:04)  Lactate, Blood: 2.1 mmol/L (10-26-22 @ 22:36)      INFECTIOUS DISEASES TESTING  Legionella Antigen, Urine: Positive (10-27-22 @ 14:40)  MRSA PCR Result.: Negative (10-27-22 @ 12:42)  Procalcitonin, Serum: 3.71 ng/mL (10-27-22 @ 12:03)      RADIOLOGY & ADDITIONAL TESTS:  I have personally reviewed the last Chest xray  CXR      CT      CARDIOLOGY TESTING  12 Lead ECG:   Ventricular Rate 110 BPM    Atrial Rate 110 BPM    P-R Interval 162 ms    QRS Duration 84 ms    Q-T Interval 326 ms    QTC Calculation(Bazett) 441 ms    P Axis 60 degrees    R Axis 95 degrees    T Axis 3 degrees    Diagnosis Line Sinus tachycardia  Rightward axis  Borderline ECG    Confirmed by Morro Argueta ([id number]) on 10/27/2022 8:06:39 AM (10-26-22 @ 20:26)      MEDICATIONS  azithromycin  IVPB 500 IV Intermittent every 24 hours  bisacodyl 20 Oral once  dextrose 5%. 1000 IV Continuous <Continuous>  dextrose 5%. 1000 IV Continuous <Continuous>  dextrose 50% Injectable 25 IV Push once  dextrose 50% Injectable 25 IV Push once  dextrose 50% Injectable 12.5 IV Push once  fluticasone propionate 50 MICROgram(s)/spray Nasal Spray 1 Both Nostrils two times a day  glucagon  Injectable 1 IntraMuscular once  insulin glargine Injectable (LANTUS) 15 SubCutaneous at bedtime  insulin lispro (ADMELOG) corrective regimen sliding scale  SubCutaneous three times a day before meals  insulin lispro Injectable (ADMELOG) 7 SubCutaneous three times a day before meals  magnesium oxide 400 Oral three times a day with meals  pantoprazole  Injectable 40 IV Push two times a day  polyethylene glycol/electrolyte Solution. 4000 Oral once  tamsulosin 0.8 Oral at bedtime      Weight  Weight (kg): 79.4 (10-26-22 @ 20:24)    ANTIBIOTICS:  azithromycin  IVPB 500 milliGRAM(s) IV Intermittent every 24 hours      ALLERGIES:  No Known Allergies

## 2022-10-31 NOTE — MEDICAL STUDENT PROGRESS NOTE(EDUCATION) - SUBJECTIVE AND OBJECTIVE BOX
STAN ELLINGTON 67y Male  MRN#: [id number]     Hospital Day: 4d    Pt is currently admitted with the primary diagnosis of CAP    SUBJECTIVE  No acute events overnight. Pt seen and examined this morning, states he is feeling better. Denies fevers, chills, headaches, SOB, CP, nausea, vomiting.                                            ----------------------------------------------------------  OBJECTIVE  PAST MEDICAL & SURGICAL HISTORY  HTN (hypertension)    HLD (hyperlipidemia)    DM (diabetes mellitus)                                              -----------------------------------------------------------  ALLERGIES:  No Known Allergies                                            ------------------------------------------------------------    HOME MEDICATIONS  Home Medications:  amLODIPine 5 mg oral tablet: 1 tab(s) orally once a day (28 Oct 2022 13:15)  atorvastatin 10 mg oral tablet: 1 tab(s) orally once a day (28 Oct 2022 13:15)  empagliflozin 25 mg oral tablet: 1 tab(s) orally once a day (in the morning) (28 Oct 2022 13:15)  Flonase 50 mcg/inh nasal spray: 1 spray(s) nasal once a day (28 Oct 2022 13:16)  hydroCHLOROthiazide 12.5 mg oral capsule: 1 cap(s) orally once a day (28 Oct 2022 13:16)  losartan 100 mg oral tablet: 1 tab(s) orally once a day (28 Oct 2022 13:17)  metFORMIN 1000 mg oral tablet: 1 tab(s) orally 2 times a day (28 Oct 2022 13:18)  Metoprolol Tartrate 25 mg oral tablet: 1 tab(s) orally 2 times a day (28 Oct 2022 13:18)  SITagliptin 100 mg oral tablet: 1 tab(s) orally once a day (28 Oct 2022 13:18)  tamsulosin 0.4 mg oral capsule: 2 cap(s) orally once a day (28 Oct 2022 13:18)                           MEDICATIONS:  STANDING MEDICATIONS  cefepime   IVPB      cefepime   IVPB 1000 milliGRAM(s) IV Intermittent every 12 hours  dextrose 5%. 1000 milliLiter(s) IV Continuous <Continuous>  dextrose 5%. 1000 milliLiter(s) IV Continuous <Continuous>  dextrose 50% Injectable 25 Gram(s) IV Push once  dextrose 50% Injectable 12.5 Gram(s) IV Push once  dextrose 50% Injectable 25 Gram(s) IV Push once  doxycycline IVPB      doxycycline IVPB 100 milliGRAM(s) IV Intermittent every 12 hours  fluticasone propionate 50 MICROgram(s)/spray Nasal Spray 1 Spray(s) Both Nostrils two times a day  glucagon  Injectable 1 milliGRAM(s) IntraMuscular once  heparin   Injectable 5000 Unit(s) SubCutaneous every 8 hours  insulin glargine Injectable (LANTUS) 10 Unit(s) SubCutaneous at bedtime  insulin lispro (ADMELOG) corrective regimen sliding scale   SubCutaneous three times a day before meals  insulin lispro Injectable (ADMELOG) 5 Unit(s) SubCutaneous three times a day before meals  magnesium oxide 400 milliGRAM(s) Oral three times a day with meals  pantoprazole    Tablet 40 milliGRAM(s) Oral before breakfast  tamsulosin 0.8 milliGRAM(s) Oral at bedtime    PRN MEDICATIONS  acetaminophen     Tablet .. 650 milliGRAM(s) Oral every 6 hours PRN  dextrose Oral Gel 15 Gram(s) Oral once PRN                                            ------------------------------------------------------------  VITAL SIGNS: Last 24 Hours  T(C): 37.1 (31 Oct 2022 06:11), Max: 37.1 (31 Oct 2022 06:11)  T(F): 98.8 (31 Oct 2022 06:11), Max: 98.8 (31 Oct 2022 06:11)  HR: 83 (31 Oct 2022 06:11) (74 - 110)  BP: 132/62 (31 Oct 2022 06:11) (113/62 - 137/67)  BP(mean): 86 (31 Oct 2022 05:26) (84 - 86)  RR: 18 (31 Oct 2022 06:11) (16 - 18)  SpO2: 96% (31 Oct 2022 05:26) (96% - 96%)                                             --------------------------------------------------------------  LABS:                        6.1    6.93  )-----------( 350      ( 30 Oct 2022 23:14 )             17.2     10-30    140  |  105  |  25<H>  ----------------------------<  300<H>  3.8   |  23  |  0.7    Ca    8.1<L>      30 Oct 2022 08:11  Mg     1.7     10-30    TPro  5.6<L>  /  Alb  3.0<L>  /  TBili  0.5  /  DBili  x   /  AST  143<H>  /  ALT  137<H>  /  AlkPhos  182<H>  10-30      PHYSICAL EXAM:  GENERAL: NAD, lying in bed comfortably  HEAD:  Atraumatic, Normocephalic  EYES:  conjunctiva and sclera clear  ENT: Moist mucous membranes  NECK: Supple, No JVD  CHEST/LUNG: Unlabored respirations, +rhonchi RUL  HEART: Regular rate and rhythm  ABDOMEN: Soft, nontender, nondistended  EXTREMITIES:  No clubbing, cyanosis, or edema  NERVOUS SYSTEM:  A&Ox3                                           --------------------------------------------------------------    ASSESSMENT & PLAN    #Anemia   -Hgb 6.1(10/31) drop from 12.3(10/27) on admission, s/p 1 unit PRBC   -Anemia workup- iron studies, fecal occult blood immunology   -f/u CBC  -SALAS + for black stool  -f/u CT AP  -f/u GI recs    # Sepsis 2/2 to R lobar pneumonia   -CXR with RUL opacity   - c/w IV Abx: cefepime and doxy  -urine legionella (+)  - Tmax 103.5, wbc 13 on admission  - SPo2 96% on RA  - RVP neg, covid neg, urine strep neg, MRSA neg  -Sputum Cx-numerous gram (-) and (+) rods, few squamous cells  -f/u on Blood Cx -No growth 10/26    #transaminitis   -LFTs trending up-   (10/31)  -f/u acute hep panel- hep C non reactive  -hold statin  -RUQ sono- Hepatic steatosis. Nonvisualization of the gallbladder    # hypovolemic hyponatremia- resolved  - Na 131 on admission; Na 140 (10/31)  - monitor      #HTN   -takes amlodipine, losartan, HCTZ, lopressor at home  -HOLD all bp meds in setting of normotensive and septic     # DM  -hold home po meds while inpatient (metofrmin, empiglozin, sitagliptin)   - increase lantus to 10 and lispro 5 TID, ISS  -A1C- 8.1  - Monitor Fs, adjust prn     #HLD  -hold home lipitor 10 for transaminits     # prerenal SANCHEZ 2/2 to hypovolemia-resolved   - Cr 1.6 on admission, currently at baseline .8 after fluids     BPH  -c/w home tamsulosin .8                                                                             ----------------------------------------------------  # DVT prophylaxis- heparin sq   # GI prophylaxis- Protonix 40 mg IV q12  # Diet- DASH/TLC  # Code status- Full  # Disposition- Home STAN ELLINGTON 67y Male  MRN#: [id number]     Hospital Day: 4d    Pt is currently admitted with the primary diagnosis of CAP    SUBJECTIVE  No acute events overnight. Pt seen and examined this morning, states he is feeling better. Denies fevers, chills, headaches, SOB, CP, nausea, vomiting.                                            ----------------------------------------------------------  OBJECTIVE  PAST MEDICAL & SURGICAL HISTORY  HTN (hypertension)    HLD (hyperlipidemia)    DM (diabetes mellitus)                                              -----------------------------------------------------------  ALLERGIES:  No Known Allergies                                            ------------------------------------------------------------    HOME MEDICATIONS  Home Medications:  amLODIPine 5 mg oral tablet: 1 tab(s) orally once a day (28 Oct 2022 13:15)  atorvastatin 10 mg oral tablet: 1 tab(s) orally once a day (28 Oct 2022 13:15)  empagliflozin 25 mg oral tablet: 1 tab(s) orally once a day (in the morning) (28 Oct 2022 13:15)  Flonase 50 mcg/inh nasal spray: 1 spray(s) nasal once a day (28 Oct 2022 13:16)  hydroCHLOROthiazide 12.5 mg oral capsule: 1 cap(s) orally once a day (28 Oct 2022 13:16)  losartan 100 mg oral tablet: 1 tab(s) orally once a day (28 Oct 2022 13:17)  metFORMIN 1000 mg oral tablet: 1 tab(s) orally 2 times a day (28 Oct 2022 13:18)  Metoprolol Tartrate 25 mg oral tablet: 1 tab(s) orally 2 times a day (28 Oct 2022 13:18)  SITagliptin 100 mg oral tablet: 1 tab(s) orally once a day (28 Oct 2022 13:18)  tamsulosin 0.4 mg oral capsule: 2 cap(s) orally once a day (28 Oct 2022 13:18)                           MEDICATIONS:  STANDING MEDICATIONS  cefepime   IVPB      cefepime   IVPB 1000 milliGRAM(s) IV Intermittent every 12 hours  dextrose 5%. 1000 milliLiter(s) IV Continuous <Continuous>  dextrose 5%. 1000 milliLiter(s) IV Continuous <Continuous>  dextrose 50% Injectable 25 Gram(s) IV Push once  dextrose 50% Injectable 12.5 Gram(s) IV Push once  dextrose 50% Injectable 25 Gram(s) IV Push once  doxycycline IVPB      doxycycline IVPB 100 milliGRAM(s) IV Intermittent every 12 hours  fluticasone propionate 50 MICROgram(s)/spray Nasal Spray 1 Spray(s) Both Nostrils two times a day  glucagon  Injectable 1 milliGRAM(s) IntraMuscular once  heparin   Injectable 5000 Unit(s) SubCutaneous every 8 hours  insulin glargine Injectable (LANTUS) 10 Unit(s) SubCutaneous at bedtime  insulin lispro (ADMELOG) corrective regimen sliding scale   SubCutaneous three times a day before meals  insulin lispro Injectable (ADMELOG) 5 Unit(s) SubCutaneous three times a day before meals  magnesium oxide 400 milliGRAM(s) Oral three times a day with meals  pantoprazole    Tablet 40 milliGRAM(s) Oral before breakfast  tamsulosin 0.8 milliGRAM(s) Oral at bedtime    PRN MEDICATIONS  acetaminophen     Tablet .. 650 milliGRAM(s) Oral every 6 hours PRN  dextrose Oral Gel 15 Gram(s) Oral once PRN                                            ------------------------------------------------------------  VITAL SIGNS: Last 24 Hours  T(C): 37.1 (31 Oct 2022 06:11), Max: 37.1 (31 Oct 2022 06:11)  T(F): 98.8 (31 Oct 2022 06:11), Max: 98.8 (31 Oct 2022 06:11)  HR: 83 (31 Oct 2022 06:11) (74 - 110)  BP: 132/62 (31 Oct 2022 06:11) (113/62 - 137/67)  BP(mean): 86 (31 Oct 2022 05:26) (84 - 86)  RR: 18 (31 Oct 2022 06:11) (16 - 18)  SpO2: 96% (31 Oct 2022 05:26) (96% - 96%)                                             --------------------------------------------------------------  LABS:                        6.1    6.93  )-----------( 350      ( 30 Oct 2022 23:14 )             17.2     10-30    140  |  105  |  25<H>  ----------------------------<  300<H>  3.8   |  23  |  0.7    Ca    8.1<L>      30 Oct 2022 08:11  Mg     1.7     10-30    TPro  5.6<L>  /  Alb  3.0<L>  /  TBili  0.5  /  DBili  x   /  AST  143<H>  /  ALT  137<H>  /  AlkPhos  182<H>  10-30      PHYSICAL EXAM:  GENERAL: NAD, lying in bed comfortably  HEAD:  Atraumatic, Normocephalic  EYES:  conjunctiva and sclera clear  ENT: Moist mucous membranes  NECK: Supple, No JVD  CHEST/LUNG: Unlabored respirations, +rhonchi RUL  HEART: Regular rate and rhythm  ABDOMEN: Soft, nontender, nondistended  EXTREMITIES:  No clubbing, cyanosis, or edema  NERVOUS SYSTEM:  A&Ox3                                           --------------------------------------------------------------    ASSESSMENT & PLAN    #Anemia - normocytic   #Melena r/o GIB   - Hgb 6.1(10/31) drop from 12.3(10/27) on admission, s/p 2 unit PRBC   - Anemia workup- iron studies, fecal occult blood immunology   - f/u CBC  - Hemodynamically stable   - Keep active t+s, transfuse as needed   - 2 large bore IVs   - c/w IV PPI BID   - SALAS + for black stool  - f/u CT AP  - GI recs appreciated   - EGD+Colonoscopy scheduled for tomorrow 11/1  - preop labs at 20:00, npo, Golytely and dulcolax ordered     # Sepsis 2/2 to R lobar pneumonia   - CXR with RUL opacity   - s/p cefepime and doxy  - urine legionella (+)  - Tmax 103.5, wbc 13 on admission  - SPo2 96% on RA  - RVP neg, covid neg, urine strep neg, MRSA neg  - Sputum Cx-numerous gram (-) and (+) rods, few squamous cells  - f/u on Blood Cx -No growth 10/26  - c/w azithromycin   - F/u ID recs     #transaminitis Likely 2/2 legionella PNA   - LFTs trending up-   (10/31)  - f/u acute hep panel- hep C non reactive  - hold statin  - RUQ sono- Hepatic steatosis. Nonvisualization of the gallbladder    # hypovolemic hyponatremia- resolved [place]iley 2/2 legionella PNA   - Na 131 on admission; Na 140 (10/31)  - monitor      #HTN   - takes amlodipine, losartan, HCTZ, lopressor at home  - HOLD all bp meds in setting of normotensive and septic     # DM  - hold home po meds while inpatient (metofrmin, empiglozin, sitagliptin)   - increase lantus to 10 and lispro 5 TID, ISS  - A1C- 8.1  - Monitor Fs, adjust prn     #HLD  - hold home lipitor 10 for transaminits     # prerenal SANCHEZ 2/2 to hypovolemia-resolved   - Cr 1.6 on admission, currently at baseline .8 after fluids     BPH  -c/w home tamsulosin .8                                                                             ----------------------------------------------------  # DVT prophylaxis- SCDs   # GI prophylaxis- Protonix 40 mg IV q12  # Diet- NPO   # Code status- Full  # Disposition- Home

## 2022-10-31 NOTE — CONSULT NOTE ADULT - ATTENDING COMMENTS
Pt seen/examined, reports fatigue and possible dark stools, appears well, no further ongoing/overt GI bleeding. Recommend continue resuscitative measures, prbc transfusion aim Hb >7. Continue PPI for now. Plan for EGD/colonoscopy tentatively tomorrow.

## 2022-10-31 NOTE — CONSULT NOTE ADULT - ASSESSMENT
ASSESSMENT  66 yo male, [place]i speaking,  PMhx of HTN, HLD, DM presenting for fever and productive cough    IMPRESSION  #Legionella pneumonia with RUL consolidation   - Legionella Antigen, Urine: Positive:(10.27.22 @ 14:40)    #Anemia concern for GI Bleed  #SANCHEZ - resolved  #DM II   #Abx allergy: NKDA      RECOMMENDATIONS  - continue azithromycin 500 mg daily - plan for 10 day coures  - check EKG while on azithromycin to ensure no worsening QTC  - trend CBC     Please call or message on Microsoft Teams if with any questions.  Spectra [phone]    
68 yo male, Tajik speaking,  PMhx of HTN, HLD, DM presenting for fever and productive cough. pt was admitted for PNA. Hospital course complicated with drop in hemoglobin and melena. GI consulted to r/o GIB.     # Acute Anemia and Melena R/O GI bleeding (DDX PUD, AVMs, GI malignancies or other vascular lesions)  Hemoglobin dropped from 12> 6.1, Patient is hemodynamically stable. Pt reports melena confirmed with SALAS.     Plan   - will plan for EGD and colonoscopy tomorrow   - PPI Drip  - Please correct electrolytes (Target Na = 135-145 | Mg = 1.7-2.2 | K = 3.5-5)  - Please correct INR to <1.5  - Please target Hb  >7  - Please ensure there is one set of CBC BMP and Coagulation profile day prior to procedure and all labs to be optimized the day prior to procedure  - D/C AM Labs unless clinically required  - NPO after midnight    Prep Instructions    - 1 Day Before Procedure - Please ensure patient is on CLEAR LIQUID DIET and ORDER bowel prep - 1 Gallon of Golytely, dulcolax 20mg at night     - Night Before Procedure - Please keep patient NPO after midnight    # Hepatic steatosis   [facility]     # Acute liver injury likely DILI received multiple Abx)   Plan: trend LFTs, check hepatitis panel, avoid hepatotoxic agents, Check INR.     d/w patient and primary team

## 2022-10-31 NOTE — CONSULT NOTE ADULT - SUBJECTIVE AND OBJECTIVE BOX
Gastroenterology Consultation:    Patient is a 67y old  Male who presents with a chief complaint of fever + cough (31 Oct 2022 11:54)        Admitted on: 10-27-22      HPI:  66 yo male, Surinamese speaking,  PMhx of HTN, HLD, DM presenting for fever and productive cough. Pt with productive cough  green sputum for about a week. Today, started having fever which prompted him to go his PMD earlier today where he had a CXR that showed a PNA for which he was advised to come to ED for evaluation. Denies sick contacts or recent travel. Pt denies chills, weakness, chest pain, sob, abd pain/n/v/d, or urinary sxs.      ED course:   VS: Tmax 103.5, /61, , SpO2 95% on RA     LABS:                          12.3   13.77 )-----------( 242      ( 26 Oct 2022 22:36 )             34.9     10-26    130<L>  |  92<L>  |  29<H>  ----------------------------<  328<H>  3.6   |  23  |  1.6<H>    Ca    8.3<L>      26 Oct 2022 22:36    TPro  6.2  /  Alb  3.3<L>  /  TBili  0.5  /  DBili  x   /  AST  57<H>  /  ALT  58<H>  /  AlkPhos  96  10-26    Imaging:   CXR:R lobar pneumonia          (27 Oct 2022 03:41)        Prior EGD:    Prior Colonoscopy:      PAST MEDICAL & SURGICAL HISTORY:  HTN (hypertension)      HLD (hyperlipidemia)      DM (diabetes mellitus)            FAMILY HISTORY:      Social History:  Tobacco:  Alcohol:  Drugs:    Home Medications:  amLODIPine 5 mg oral tablet: 1 tab(s) orally once a day (28 Oct 2022 13:15)  atorvastatin 10 mg oral tablet: 1 tab(s) orally once a day (28 Oct 2022 13:15)  empagliflozin 25 mg oral tablet: 1 tab(s) orally once a day (in the morning) (28 Oct 2022 13:15)  Flonase 50 mcg/inh nasal spray: 1 spray(s) nasal once a day (28 Oct 2022 13:16)  hydroCHLOROthiazide 12.5 mg oral capsule: 1 cap(s) orally once a day (28 Oct 2022 13:16)  losartan 100 mg oral tablet: 1 tab(s) orally once a day (28 Oct 2022 13:17)  metFORMIN 1000 mg oral tablet: 1 tab(s) orally 2 times a day (28 Oct 2022 13:18)  Metoprolol Tartrate 25 mg oral tablet: 1 tab(s) orally 2 times a day (28 Oct 2022 13:18)  SITagliptin 100 mg oral tablet: 1 tab(s) orally once a day (28 Oct 2022 13:18)  tamsulosin 0.4 mg oral capsule: 2 cap(s) orally once a day (28 Oct 2022 13:18)        MEDICATIONS  (STANDING):  cefepime   IVPB      cefepime   IVPB 1000 milliGRAM(s) IV Intermittent every 12 hours  dextrose 5%. 1000 milliLiter(s) (50 mL/Hr) IV Continuous <Continuous>  dextrose 5%. 1000 milliLiter(s) (100 mL/Hr) IV Continuous <Continuous>  dextrose 50% Injectable 25 Gram(s) IV Push once  dextrose 50% Injectable 25 Gram(s) IV Push once  dextrose 50% Injectable 12.5 Gram(s) IV Push once  doxycycline IVPB      doxycycline IVPB 100 milliGRAM(s) IV Intermittent every 12 hours  fluticasone propionate 50 MICROgram(s)/spray Nasal Spray 1 Spray(s) Both Nostrils two times a day  glucagon  Injectable 1 milliGRAM(s) IntraMuscular once  heparin   Injectable 5000 Unit(s) SubCutaneous every 8 hours  insulin glargine Injectable (LANTUS) 15 Unit(s) SubCutaneous at bedtime  insulin lispro (ADMELOG) corrective regimen sliding scale   SubCutaneous three times a day before meals  insulin lispro Injectable (ADMELOG) 7 Unit(s) SubCutaneous three times a day before meals  magnesium oxide 400 milliGRAM(s) Oral three times a day with meals  pantoprazole  Injectable 40 milliGRAM(s) IV Push two times a day  tamsulosin 0.8 milliGRAM(s) Oral at bedtime    MEDICATIONS  (PRN):  acetaminophen     Tablet .. 650 milliGRAM(s) Oral every 6 hours PRN Temp greater or equal to 38C (100.4F), Mild Pain (1 - 3)  dextrose Oral Gel 15 Gram(s) Oral once PRN Blood Glucose LESS THAN 70 milliGRAM(s)/deciliter      Allergies  No Known Allergies      Review of Systems:   Constitutional:  No Fever, No Chills  ENT/Mouth:  No Hearing Changes,  No Difficulty Swallowing  Eyes:  No Eye Pain, No Vision Changes  Cardiovascular:  No Chest Pain, No Palpitations  Respiratory:  No Cough, No Dyspnea  Gastrointestinal:  As described in HPI  Musculoskeletal:  No Joint Swelling, No Back Pain  Skin:  No Skin Lesions, No Jaundice  Neuro:  No Syncope, No Dizziness  Heme/Lymph:  No Bruising, No Bleeding.          Physical Examination:  T(C): 37.1 (10-31-22 @ 06:11), Max: 37.1 (10-31-22 @ 06:11)  HR: 83 (10-31-22 @ 06:11) (74 - 110)  BP: 132/62 (10-31-22 @ 06:11) (113/62 - 137/67)  RR: 18 (10-31-22 @ 06:11) (16 - 18)  SpO2: 96% (10-31-22 @ 05:26) (96% - 96%)          GENERAL: AAOx3, no acute distress.  HEAD:  Atraumatic, Normocephalic  EYES: conjunctiva and sclera clear  NECK: Supple, no JVD or thyromegaly  CHEST/LUNG: Clear to auscultation bilaterally; No wheeze, rhonchi, or rales  HEART: Regular rate and rhythm; normal S1, S2, No murmurs.  ABDOMEN: Soft, nontender, nondistended; Bowel sounds present  NEUROLOGY: No asterixis or tremor.   SKIN: Intact, no jaundice        Data:                        6.1    6.93  )-----------( 350      ( 30 Oct 2022 23:14 )             17.2     Hgb Trend:  6.1  10-30-22 @ 23:14  7.8  10-30-22 @ 08:11  10.6  10-29-22 @ 07:02        10-30    140  |  105  |  25<H>  ----------------------------<  300<H>  3.8   |  23  |  0.7    Ca    8.1<L>      30 Oct 2022 08:11  Mg     1.7     10-30    TPro  5.6<L>  /  Alb  3.0<L>  /  TBili  0.5  /  DBili  x   /  AST  143<H>  /  ALT  137<H>  /  AlkPhos  182<H>  10-30    Liver panel trend:  TBili 0.5   /      /      /   AlkP 182   /   Tptn 5.6   /   Alb 3.0    /   DBili --      10-30  TBili 0.5   /      /   ALT 99   /   AlkP 131   /   Tptn 5.1   /   Alb 2.7    /   DBili --      10-29  TBili 0.6   /   AST 65   /   ALT 63   /   AlkP 103   /   Tptn 5.9   /   Alb 3.1    /   DBili --      10-28  TBili 0.5   /   AST 63   /   ALT 64   /   AlkP 103   /   Tptn 6.4   /   Alb 3.3    /   DBili --      10-27  TBili 0.5   /   AST 57   /   ALT 58   /   AlkP 96   /   Tptn 6.2   /   Alb 3.3    /   DBili --      10-26              Radiology:       Gastroenterology Consultation:    Patient is a 67y old  Male who presents with a chief complaint of fever + cough (31 Oct 2022 11:54)        Admitted on: 10-27-22      HPI:  68 yo male, Irish speaking,  PMhx of HTN, HLD, DM presenting for fever and productive cough. Pt with productive cough  green sputum for about a week. pt was admitted for PNA. hospital course complicated with drop in hemoglobin and melena. GI consulted to r/o GIB.       Prior EGDColonoscopy: 2016 @ [facility] diverticulitis and hemorrhoids       PAST MEDICAL & SURGICAL HISTORY:  HTN (hypertension)      HLD (hyperlipidemia)      DM (diabetes mellitus)            FAMILY HISTORY:  no FH of GI cancers     Social History:  Tobacco: denies   Alcohol: denies   Drugs: denies     Home Medications:  amLODIPine 5 mg oral tablet: 1 tab(s) orally once a day (28 Oct 2022 13:15)  atorvastatin 10 mg oral tablet: 1 tab(s) orally once a day (28 Oct 2022 13:15)  empagliflozin 25 mg oral tablet: 1 tab(s) orally once a day (in the morning) (28 Oct 2022 13:15)  Flonase 50 mcg/inh nasal spray: 1 spray(s) nasal once a day (28 Oct 2022 13:16)  hydroCHLOROthiazide 12.5 mg oral capsule: 1 cap(s) orally once a day (28 Oct 2022 13:16)  losartan 100 mg oral tablet: 1 tab(s) orally once a day (28 Oct 2022 13:17)  metFORMIN 1000 mg oral tablet: 1 tab(s) orally 2 times a day (28 Oct 2022 13:18)  Metoprolol Tartrate 25 mg oral tablet: 1 tab(s) orally 2 times a day (28 Oct 2022 13:18)  SITagliptin 100 mg oral tablet: 1 tab(s) orally once a day (28 Oct 2022 13:18)  tamsulosin 0.4 mg oral capsule: 2 cap(s) orally once a day (28 Oct 2022 13:18)        MEDICATIONS  (STANDING):  cefepime   IVPB      cefepime   IVPB 1000 milliGRAM(s) IV Intermittent every 12 hours  dextrose 5%. 1000 milliLiter(s) (50 mL/Hr) IV Continuous <Continuous>  dextrose 5%. 1000 milliLiter(s) (100 mL/Hr) IV Continuous <Continuous>  dextrose 50% Injectable 25 Gram(s) IV Push once  dextrose 50% Injectable 25 Gram(s) IV Push once  dextrose 50% Injectable 12.5 Gram(s) IV Push once  doxycycline IVPB      doxycycline IVPB 100 milliGRAM(s) IV Intermittent every 12 hours  fluticasone propionate 50 MICROgram(s)/spray Nasal Spray 1 Spray(s) Both Nostrils two times a day  glucagon  Injectable 1 milliGRAM(s) IntraMuscular once  heparin   Injectable 5000 Unit(s) SubCutaneous every 8 hours  insulin glargine Injectable (LANTUS) 15 Unit(s) SubCutaneous at bedtime  insulin lispro (ADMELOG) corrective regimen sliding scale   SubCutaneous three times a day before meals  insulin lispro Injectable (ADMELOG) 7 Unit(s) SubCutaneous three times a day before meals  magnesium oxide 400 milliGRAM(s) Oral three times a day with meals  pantoprazole  Injectable 40 milliGRAM(s) IV Push two times a day  tamsulosin 0.8 milliGRAM(s) Oral at bedtime    MEDICATIONS  (PRN):  acetaminophen     Tablet .. 650 milliGRAM(s) Oral every 6 hours PRN Temp greater or equal to 38C (100.4F), Mild Pain (1 - 3)  dextrose Oral Gel 15 Gram(s) Oral once PRN Blood Glucose LESS THAN 70 milliGRAM(s)/deciliter      Allergies  No Known Allergies    Review of Systems:   Constitutional:  No Fever, No Chills  ENT/Mouth:  No Hearing Changes,  No Difficulty Swallowing  Eyes:  No Eye Pain, No Vision Changes  Cardiovascular:  No Chest Pain, No Palpitations  Respiratory:  No Cough, No Dyspnea  Gastrointestinal:  As described in HPI  Musculoskeletal:  No Joint Swelling, No Back Pain  Skin:  No Skin Lesions, No Jaundice  Neuro:  No Syncope, No Dizziness  Heme/Lymph:  No Bruising, No Bleeding.    Physical Examination:  T(C): 37.1 (10-31-22 @ 06:11), Max: 37.1 (10-31-22 @ 06:11)  HR: 83 (10-31-22 @ 06:11) (74 - 110)  BP: 132/62 (10-31-22 @ 06:11) (113/62 - 137/67)  RR: 18 (10-31-22 @ 06:11) (16 - 18)  SpO2: 96% (10-31-22 @ 05:26) (96% - 96%)    GENERAL: AAOx3, no acute distress.  HEAD:  Atraumatic, Normocephalic  EYES: conjunctiva and sclera clear  NECK: Supple, no JVD or thyromegaly  CHEST/LUNG: Clear to auscultation bilaterally; No wheeze, rhonchi, or rales  HEART: Regular rate and rhythm; normal S1, S2, No murmurs.  ABDOMEN: Soft, nontender, nondistended; Bowel sounds present  NEUROLOGY: No asterixis or tremor.   SKIN: Intact, no jaundice        Data:                        6.1    6.93  )-----------( 350      ( 30 Oct 2022 23:14 )             17.2     Hgb Trend:  6.1  10-30-22 @ 23:14  7.8  10-30-22 @ 08:11  10.6  10-29-22 @ 07:02        10-30    140  |  105  |  25<H>  ----------------------------<  300<H>  3.8   |  23  |  0.7    Ca    8.1<L>      30 Oct 2022 08:11  Mg     1.7     10-30    TPro  5.6<L>  /  Alb  3.0<L>  /  TBili  0.5  /  DBili  x   /  AST  143<H>  /  ALT  137<H>  /  AlkPhos  182<H>  10-30    Liver panel trend:  TBili 0.5   /      /      /   AlkP 182   /   Tptn 5.6   /   Alb 3.0    /   DBili --      10-30  TBili 0.5   /      /   ALT 99   /   AlkP 131   /   Tptn 5.1   /   Alb 2.7    /   DBili --      10-29  TBili 0.6   /   AST 65   /   ALT 63   /   AlkP 103   /   Tptn 5.9   /   Alb 3.1    /   DBili --      10-28  TBili 0.5   /   AST 63   /   ALT 64   /   AlkP 103   /   Tptn 6.4   /   Alb 3.3    /   DBili --      10-27  TBili 0.5   /   AST 57   /   ALT 58   /   AlkP 96   /   Tptn 6.2   /   Alb 3.3    /   DBili --      10-26      Radiology:      < from: US Abdomen Upper Quadrant Right (10.29.22 @ 12:08) >  Hepatic steatosis.    Nonvisualization of the gallbladder. Correlate with history for   cholecystectomy.    < end of copied text >

## 2022-11-01 ENCOUNTER — TRANSCRIPTION ENCOUNTER (OUTPATIENT)
Age: 67
End: 2022-11-01

## 2022-11-01 ENCOUNTER — RESULT REVIEW (OUTPATIENT)
Age: 67
End: 2022-11-01

## 2022-11-01 LAB
CULTURE RESULTS: SIGNIFICANT CHANGE UP
CULTURE RESULTS: SIGNIFICANT CHANGE UP
FERRITIN SERPL-MCNC: 473 NG/ML — HIGH (ref 30–400)
FERRITIN SERPL-MCNC: 607 NG/ML — HIGH (ref 30–400)
GLUCOSE BLDC GLUCOMTR-MCNC: 133 MG/DL — HIGH (ref 70–99)
GLUCOSE BLDC GLUCOMTR-MCNC: 136 MG/DL — HIGH (ref 70–99)
GLUCOSE BLDC GLUCOMTR-MCNC: 145 MG/DL — HIGH (ref 70–99)
GLUCOSE BLDC GLUCOMTR-MCNC: 201 MG/DL — HIGH (ref 70–99)
IRON SATN MFR SERPL: 22 % — SIGNIFICANT CHANGE UP (ref 15–50)
IRON SATN MFR SERPL: 43 UG/DL — SIGNIFICANT CHANGE UP (ref 35–150)
SARS-COV-2 RNA SPEC QL NAA+PROBE: SIGNIFICANT CHANGE UP
SPECIMEN SOURCE: SIGNIFICANT CHANGE UP
SPECIMEN SOURCE: SIGNIFICANT CHANGE UP
TIBC SERPL-MCNC: 194 UG/DL — LOW (ref 220–430)
TRANSFERRIN SERPL-MCNC: 167 MG/DL — LOW (ref 200–360)
UIBC SERPL-MCNC: 151 UG/DL — SIGNIFICANT CHANGE UP (ref 110–370)

## 2022-11-01 PROCEDURE — 43239 EGD BIOPSY SINGLE/MULTIPLE: CPT

## 2022-11-01 PROCEDURE — 45378 DIAGNOSTIC COLONOSCOPY: CPT

## 2022-11-01 PROCEDURE — 99233 SBSQ HOSP IP/OBS HIGH 50: CPT

## 2022-11-01 PROCEDURE — 88312 SPECIAL STAINS GROUP 1: CPT | Mod: 26

## 2022-11-01 PROCEDURE — 88305 TISSUE EXAM BY PATHOLOGIST: CPT | Mod: 26

## 2022-11-01 RX ORDER — SODIUM CHLORIDE 9 MG/ML
1000 INJECTION, SOLUTION INTRAVENOUS
Refills: 0 | Status: DISCONTINUED | OUTPATIENT
Start: 2022-11-01 | End: 2022-11-02

## 2022-11-01 RX ORDER — ENOXAPARIN SODIUM 100 MG/ML
40 INJECTION SUBCUTANEOUS EVERY 24 HOURS
Refills: 0 | Status: DISCONTINUED | OUTPATIENT
Start: 2022-11-01 | End: 2022-11-02

## 2022-11-01 RX ORDER — PANTOPRAZOLE SODIUM 20 MG/1
40 TABLET, DELAYED RELEASE ORAL
Refills: 0 | Status: DISCONTINUED | OUTPATIENT
Start: 2022-11-01 | End: 2022-11-02

## 2022-11-01 RX ADMIN — TAMSULOSIN HYDROCHLORIDE 0.8 MILLIGRAM(S): 0.4 CAPSULE ORAL at 21:36

## 2022-11-01 RX ADMIN — PANTOPRAZOLE SODIUM 40 MILLIGRAM(S): 20 TABLET, DELAYED RELEASE ORAL at 18:20

## 2022-11-01 RX ADMIN — Medication 7 UNIT(S): at 13:37

## 2022-11-01 RX ADMIN — Medication 7 UNIT(S): at 18:13

## 2022-11-01 RX ADMIN — Medication 1 SPRAY(S): at 06:22

## 2022-11-01 RX ADMIN — Medication 1 SPRAY(S): at 18:13

## 2022-11-01 RX ADMIN — SODIUM CHLORIDE 75 MILLILITER(S): 9 INJECTION, SOLUTION INTRAVENOUS at 19:53

## 2022-11-01 RX ADMIN — MAGNESIUM OXIDE 400 MG ORAL TABLET 400 MILLIGRAM(S): 241.3 TABLET ORAL at 18:12

## 2022-11-01 RX ADMIN — PANTOPRAZOLE SODIUM 40 MILLIGRAM(S): 20 TABLET, DELAYED RELEASE ORAL at 06:22

## 2022-11-01 RX ADMIN — AZITHROMYCIN 255 MILLIGRAM(S): 500 TABLET, FILM COATED ORAL at 18:13

## 2022-11-01 RX ADMIN — Medication 2: at 13:38

## 2022-11-01 RX ADMIN — INSULIN GLARGINE 15 UNIT(S): 100 INJECTION, SOLUTION SUBCUTANEOUS at 21:36

## 2022-11-01 RX ADMIN — ENOXAPARIN SODIUM 40 MILLIGRAM(S): 100 INJECTION SUBCUTANEOUS at 18:20

## 2022-11-01 RX ADMIN — MAGNESIUM OXIDE 400 MG ORAL TABLET 400 MILLIGRAM(S): 241.3 TABLET ORAL at 13:31

## 2022-11-01 NOTE — PROGRESS NOTE ADULT - ASSESSMENT
# Sepsis 2/2 to R lobar pneumonia   -CXR with RUL opacity   - c/w IV Abx: rocephin and doxy  - Tmax 103.5, wbc 13 on admission  - SPo2 96% on 2L NC  - RVP neg, covid neg   - MRSA neg  -Sputum Cx-numerous gram (-) and (+) rods, few squamous cells  - urine strep(-), f/u urine legionella  -f/u on Blood Cx -No growth 10/26    #transaminitis   -Monitor LFTs AST 65 ALT 63 (10/28)  -f/u acute hep panel  -hold statin  -RUQ sono     # hypovolemic hyponatremia  - Na 131 on admission; Na 134 (10/28)  - c/w NS @ 75cc/hr  - monitor      #HTN   -takes amlodipine, losartan, HCTZ, lopressor at home  -HOLD all bp meds in setting of normotensive and septic     # DM  -hold home po meds while inpatient (metofrmin, empiglozin, sitagliptin)   - increase lantus to 10 and lispro 5 TID, ISS  -A1C- 8.1  - Monitor Fs, adjust prn     #HLD  -hold home lipitor 10 for transaminits     # prerenal SANCHEZ 2/2 to hypovolemia-resolved   - Cr 1.6 on admission, currently at baseline .8 after fluids     #BPH  -c/w home tamsulosin .8                                                                             ----------------------------------------------------  # DVT prophylaxis- heparin sq   # GI prophylaxis- none   # Diet- DASH/TLC  # Code status- Full  # Disposition- Home  
66 yo M with hx of HTN, HLD and DM II presents to ED for 1 week history of productive cough with fever started yesterday prompting him to went to his PMD. His PMD found PNA on CXR and advised him to come to ED.     ·	Sepsis / Legionella Pneumonia   ·	Acute Anemia / Erosive Gastritis   ·	SANCHEZ - prerenal   ·	Hypokalemia   ·	suspected Mg2+ def  ·	Acute Hyponatremia (resolved)  ·	DM II     -antibiotics changed to azithro - urine legionella positive; procalcitonin > 3.7   -replete electrolytes as needed   -serum Cr improved to 0.6 from 1.6  -sq insulin with FS monitoring   -s/p 2 units PRBCs - s/p EGD/Colonoscopy today - with erosive gastritis and diverticulosis (biopsy results to be followed) - c/w PPI twice daily 4- 6 weeks and outpatient follow up with GI   -oob to chair as tolerated     DISPO; home likely in 24 hrs - procedures today (anticipate)  -patient understands his plan of care -  phone used     Attending Physician Dr. Tuyet Lee # [id number]   
68 yo M with hx of HTN, HLD and DM II presents to ED for 1 week history of productive cough with fever started yesterday prompting him to went to his PMD. His PMD found PNA on CXR and advised him to come to ED.     ·	Sepsis / CAP  ·	SANCHEZ - prerenal   ·	Hypokalemia   ·	suspected Mg2+ def  ·	Acute Hyponatremia (resolved)  ·	DM II   ·	Anemia - likely dilutional     -continue with IV abx; blood cultures 10/26/22 negative; Levaquin dose added; urine legionella positive; procalcitonin > 3.7  -replete electrolytes as needed   -serum Cr improved to 0.7 from 1.6  -sq insulin with FS monitoring   -keep active type and screen - transfuse to keep hb > 7.5   -oob to chair as tolerated     DISPO; home once clinically stable - likely 24- 48hrs   -patient understands his plan of care     Attending Physician Dr. Tuyet Lee # [id number]   
68 yo M with hx of HTN, HLD and DM II presents to ED for 1 week history of productive cough with fever started yesterday prompting him to went to his PMD. His PMD found PNA on CXR and advised him to come to ED.     ·	Sepsis / CAP  ·	SANCHEZ - prerenal   ·	Hypokalemia   ·	suspected Mg2+ def  ·	Acute Hyponatremia (resolved)  ·	DM II   ·	Anemia - likely dilutional     -continue with IV abx; blood cultures 10/26/22 negative; Levaquin one time dose given yesterday; urine legionella positive; procalcitonin > 3.7 - ID consult for further reccs   -replete electrolytes as needed   -serum Cr improved to 0.7 from 1.6  -sq insulin with FS monitoring   -keep active type and screen - transfuse 2 units PRBC - GI eval; check stool guaiac - EGD/Colonoscopy as per GI; will keep patient NPO   -oob to chair as tolerated     DISPO; home once clinically stable - not discharge ready  -patient understands his plan of care -  phone used     Attending Physician Dr. Tuyet Lee # [id number]   
68 yo M with hx of HTN, HLD and DM II presents to ED for 1 week history of productive cough with fever started yesterday prompting him to went to his PMD. His PMD found PNA on CXR and advised him to come to ED.     ·	Sepsis / CAP  ·	SANCHEZ - prerenal   ·	Hypokalemia   ·	suspected Mg2+ def  ·	Acute Hyponatremia   ·	DM II     -continue with abx; pancultures, CXR, pro calcitonin, sputum cx  -replete electrolytes   -serum Cr improved to 0.7 from 1.6  -sq insulin with FS monitoring   -oob to chair as tolerated     DISPO; home once clinically stable   -patient understands his plan of care     Attending Physician Dr. Tuyet Lee # [id number]   
68 yo M with hx of HTN, HLD and DM II presents to ED for 1 week history of productive cough with fever started yesterday prompting him to went to his PMD. His PMD found PNA on CXR and advised him to come to ED.     ·	Sepsis / CAP  ·	SANCHEZ - prerenal   ·	Hypokalemia   ·	suspected Mg2+ def  ·	Acute Hyponatremia   ·	DM II     -continue with abx   -pancultures, CXR, pro calcitonin, sputum cx  -replete electrolytes   -serum Cr improved to 0.8 from 1.6  -encourage oral intake - no need to repeat BMP today - monitor levels next 24 hrs   -sq insulin with FS monitoring   -oob to chair as tolerated     DISPO; home once clinically stable   -patient understands his plan of care     Attending Physician Dr. Tuyet Lee # [id number]

## 2022-11-01 NOTE — PROGRESS NOTE ADULT - TIME BILLING
direct patient care and chart review   -coordinated current plan of care with medical staff on MDR

## 2022-11-01 NOTE — MEDICAL STUDENT PROGRESS NOTE(EDUCATION) - SUBJECTIVE AND OBJECTIVE BOX
Called and spoke to her.  Bleeding is getting heavier.  Her pain is pretty intense but tolerable.  I told her findings are most consistent with an evolving miscarriage.  An ectopic cannot be excluded.  She was instructed to go to the emergency room if pain or bleeding became significant.  Otherwise she is to check her hCG level in 1 week's time   STAN ELLINGTON 67y Male  MRN#: [id number]     Hospital Day: 5d    Pt is currently admitted with the primary diagnosis of CAP     SUBJECTIVE  No acute events overnight. Pt seen and examined, states respiratory symptoms have resolved and he is feeling much better. Denies fevers, dizziness, chills, headaches, CP, SOB.                                           ----------------------------------------------------------  OBJECTIVE  PAST MEDICAL & SURGICAL HISTORY  HTN (hypertension)    HLD (hyperlipidemia)    DM (diabetes mellitus)                                              -----------------------------------------------------------  ALLERGIES:  No Known Allergies                                            ------------------------------------------------------------    HOME MEDICATIONS  Home Medications:  amLODIPine 5 mg oral tablet: 1 tab(s) orally once a day (28 Oct 2022 13:15)  atorvastatin 10 mg oral tablet: 1 tab(s) orally once a day (28 Oct 2022 13:15)  empagliflozin 25 mg oral tablet: 1 tab(s) orally once a day (in the morning) (28 Oct 2022 13:15)  Flonase 50 mcg/inh nasal spray: 1 spray(s) nasal once a day (28 Oct 2022 13:16)  hydroCHLOROthiazide 12.5 mg oral capsule: 1 cap(s) orally once a day (28 Oct 2022 13:16)  losartan 100 mg oral tablet: 1 tab(s) orally once a day (28 Oct 2022 13:17)  metFORMIN 1000 mg oral tablet: 1 tab(s) orally 2 times a day (28 Oct 2022 13:18)  Metoprolol Tartrate 25 mg oral tablet: 1 tab(s) orally 2 times a day (28 Oct 2022 13:18)  SITagliptin 100 mg oral tablet: 1 tab(s) orally once a day (28 Oct 2022 13:18)  tamsulosin 0.4 mg oral capsule: 2 cap(s) orally once a day (28 Oct 2022 13:18)                           MEDICATIONS:  STANDING MEDICATIONS  azithromycin  IVPB 500 milliGRAM(s) IV Intermittent every 24 hours  dextrose 5%. 1000 milliLiter(s) IV Continuous <Continuous>  dextrose 5%. 1000 milliLiter(s) IV Continuous <Continuous>  dextrose 50% Injectable 25 Gram(s) IV Push once  dextrose 50% Injectable 25 Gram(s) IV Push once  dextrose 50% Injectable 12.5 Gram(s) IV Push once  fluticasone propionate 50 MICROgram(s)/spray Nasal Spray 1 Spray(s) Both Nostrils two times a day  glucagon  Injectable 1 milliGRAM(s) IntraMuscular once  insulin glargine Injectable (LANTUS) 15 Unit(s) SubCutaneous at bedtime  insulin lispro (ADMELOG) corrective regimen sliding scale   SubCutaneous three times a day before meals  insulin lispro Injectable (ADMELOG) 7 Unit(s) SubCutaneous three times a day before meals  lactated ringers. 1000 milliLiter(s) IV Continuous <Continuous>  magnesium oxide 400 milliGRAM(s) Oral three times a day with meals  pantoprazole  Injectable 40 milliGRAM(s) IV Push two times a day  tamsulosin 0.8 milliGRAM(s) Oral at bedtime    PRN MEDICATIONS  acetaminophen     Tablet .. 650 milliGRAM(s) Oral every 6 hours PRN  dextrose Oral Gel 15 Gram(s) Oral once PRN                                            ------------------------------------------------------------  VITAL SIGNS: Last 24 Hours  T(C): 36.5 (01 Nov 2022 11:15), Max: 37 (31 Oct 2022 19:57)  T(F): 97.7 (01 Nov 2022 09:15), Max: 98.6 (31 Oct 2022 19:57)  HR: 80 (01 Nov 2022 11:15) (74 - 80)  BP: 160/87 (01 Nov 2022 11:15) (136/65 - 160/87)  BP(mean): 86 (01 Nov 2022 11:15) (86 - 86)  RR: 20 (01 Nov 2022 11:15) (17 - 20)  SpO2: 96% (01 Nov 2022 11:15) (96% - 96%)                                             --------------------------------------------------------------  LABS:                        9.8    11.39 )-----------( 457      ( 31 Oct 2022 22:39 )             28.5     10-31    135  |  101  |  9<L>  ----------------------------<  167<H>  3.8   |  25  |  0.6<L>    Ca    8.2<L>      31 Oct 2022 22:39  Mg     1.8     10-31    TPro  5.7<L>  /  Alb  3.1<L>  /  TBili  0.6  /  DBili  x   /  AST  135<H>  /  ALT  177<H>  /  AlkPhos  195<H>  10-31    PT/INR - ( 31 Oct 2022 22:39 )   PT: 11.10 sec;   INR: 0.97 ratio         PTT - ( 31 Oct 2022 22:39 )  PTT:25.3 sec                                              -------------------------------------------------------------  RADIOLOGY:  < from: CT Angio Abdomen and Pelvis w/ IV Cont (10.31.22 @ 15:12) >    ACC: [id number] EXAM:  CT ANGIO ABD PELV (W)AW IC                          PROCEDURE DATE:  10/31/2022        INTERPRETATION:  REASON FOR EXAM / CLINICAL STATEMENT: Acute drop in Hgb.   Evaluate for GI bleeding. Fever and cough. WBC 6.93  Hgb 6.1  PMHx of   HTN, HLD and DM II    FINDINGS:      IMPRESSION: No evidence of aortic aneurysm or dissection.    No evidence of active GI bleeding.    No evidence of retroperitoneal, intraperitoneal, intramuscular, or   subcutaneous hematoma.    There are nodular and tree-in-bud opacities in the right middle lobe,   consistent with an inflammatory / infectious process    --- End of Report ---    MARLI BLAIR MD; Attending Interventional Radiologist  This document has been electronically signed. Oct 31 2022  5:31PM                                              --------------------------------------------------------------    PHYSICAL EXAM:  GENERAL: NAD, lying in bed comfortably  HEAD:  Atraumatic, Normocephalic  EYES:  conjunctiva and clera clear  ENT: Moist mucous membranes  NECK: Supple, No JVD  CHEST/LUNG: Unlabored respirations, +rhonchi RUL  HEART: Regular rate and rhythm  ABDOMEN: Soft, nontender, nondistended  EXTREMITIES:  No clubbing, cyanosis, or edema  NERVOUS SYSTEM:  A&Ox3                                             --------------------------------------------------------------    ASSESSMENT & PLAN  66 yo male, Moroccan speaking,  PMhx of HTN, HLD, DM presenting for fever and productive cough.     #Anemia - normocytic   #Melena r/o GIB   - Hgb 6.1(10/31) drop from 12.3(10/27) on admission  -s/p 2 units PRBC Hgb 9.8 (11.01)  - Anemia workup- iron studies(TBC-194; transferrin-167). f/u fecal occult blood immunology   - f/u CBC  - Hemodynamically stable   - Keep active t+s, transfuse as needed   - 2 large bore IVs   - c/w IV PPI BID   - SALAS + for black stool  - CT AP-No evidence of active GI bleeding.  - EGD+Colonoscopy scheduled for today 11/1  -f/u GI recs    # Sepsis 2/2 to R lobar pneumonia   - CXR with RUL opacity   - s/p cefepime and doxy  - urine legionella (+)  - Tmax 103.5, wbc 13 on admission  - SPo2 96% on RA  - RVP neg, covid neg, urine strep neg, MRSA neg  - Sputum Cx-numerous gram (-) and (+) rods, few squamous cells  - f/u on Blood Cx -No growth 10/26  - per ID, c/w azithromycin for a 10 day course till 11/10/22    #transaminitis Likely 2/2 legionella PNA   - LFTs trending up-   (10/31)  - f/u acute hep panel- hep C non reactive  - hold statin  - RUQ sono- Hepatic steatosis. Nonvisualization of the gallbladder    # hypovolemic hyponatremia- resolved Liley 2/2 legionella PNA   - Na 131 on admission; Na 135 (10/31)  - monitor      #HTN   - takes amlodipine, losartan, HCTZ, lopressor at home  - HOLD all bp meds in setting of normotensive and septic     # DM  - hold home po meds while inpatient (metofrmin, empiglozin, sitagliptin)   - increase lantus to 15 and lispro 7 TID, ISS  - A1C- 8.1  - Monitor Fs, adjust prn     #HLD  - hold home lipitor 10 for transaminits     # prerenal SANCHEZ 2/2 to hypovolemia-resolved   - Cr 1.6 on admission, currently at baseline .8 after fluids     BPH  -c/w home tamsulosin .8                                                                             ----------------------------------------------------  # DVT prophylaxis- SCDs   # GI prophylaxis- Protonix 40 mg IV q12  # Diet- NPO   # Code status- Full  # Disposition- home

## 2022-11-01 NOTE — PROGRESS NOTE ADULT - SUBJECTIVE AND OBJECTIVE BOX
STAN ELLINGTON  67y  Male      Patient is a 67y old  Male who presents with a chief complaint of fever + cough (27 Oct 2022 03:41)      INTERVAL HPI/OVERNIGHT EVENTS:  pt seen this am  -admitted for sepsis/PNA - abx changed to IV cefepime   -replete electrolytes as needed   -oob to chair as tolerated     REVIEW OF SYSTEMS:  - no new complaints     Vital Signs Last 24 Hrs  T(C): 36.7 (29 Oct 2022 13:10), Max: 38.2 (28 Oct 2022 20:10)  T(F): 98.1 (29 Oct 2022 13:10), Max: 100.7 (28 Oct 2022 20:10)  HR: 98 (29 Oct 2022 13:10) (82 - 98)  BP: 146/62 (29 Oct 2022 13:10) (132/62 - 146/62)  RR: 18 (29 Oct 2022 13:10) (18 - 18)      PHYSICAL EXAM:  GENERAL: NAD, speaking appropriately   HEAD:  Atraumatic, Normocephalic  EYES: EOMI, PERRLA, conjunctiva and sclera clear  NERVOUS SYSTEM:  Alert & Oriented X 4, Good concentration  CHEST/LUNG: Rhonchi b/l  CV/HEART: Regular rate and rhythm; No murmurs, rubs, or gallops  GI/ABDOMEN: Soft abdomen   EXTREMITIES:  no edema   SKIN: No rashes or lesions    LAB:                              10.6   7.53  )-----------( 270      ( 29 Oct 2022 07:02 )             31.2   10-29    143  |  109  |  12  ----------------------------<  185<H>  3.8   |  23  |  0.7    Ca    7.6<L>      29 Oct 2022 07:02  Mg     1.7     10-29    TPro  5.1<L>  /  Alb  2.7<L>  /  TBili  0.5  /  DBili  x   /  AST  106<H>  /  ALT  99<H>  /  AlkPhos  131<H>  10-29                  11.4   8.77  )-----------( 268      ( 28 Oct 2022 05:59 )             32.5       Daily   CAPILLARY BLOOD GLUCOSE      POCT Blood Glucose.: 236 mg/dL (28 Oct 2022 08:03)  POCT Blood Glucose.: 270 mg/dL (27 Oct 2022 22:01)  POCT Blood Glucose.: 179 mg/dL (27 Oct 2022 16:55)  POCT Blood Glucose.: 134 mg/dL (27 Oct 2022 13:57)      LIVER FUNCTIONS - ( 28 Oct 2022 05:59 )  Alb: 3.1 g/dL / Pro: 5.9 g/dL / ALK PHOS: 103 U/L / ALT: 63 U/L / AST: 65 U/L / GGT: x             Culture - Sputum (collected 10-27-22 @ 14:40)  Source: .Sputum Sputum  Gram Stain (10-28-22 @ 09:41):    No polymorphonuclear leukocytes per low power field    Few Squamous epithelial cells per low power field    Numerous Gram Positive Rods seen per oil power field    Numerous Gram Negative Rods seen per oil power field      RADIOLOGY:    Imaging Personally visualized Reviewed:  [ y ] YES  [ ] NO    HEALTH ISSUES - PROBLEM Dx:    MEDICATIONS  (STANDING):  cefepime   IVPB      cefepime   IVPB 1000 milliGRAM(s) IV Intermittent every 12 hours  dextrose 5%. 1000 milliLiter(s) (100 mL/Hr) IV Continuous <Continuous>  dextrose 5%. 1000 milliLiter(s) (50 mL/Hr) IV Continuous <Continuous>  dextrose 50% Injectable 25 Gram(s) IV Push once  dextrose 50% Injectable 12.5 Gram(s) IV Push once  dextrose 50% Injectable 25 Gram(s) IV Push once  doxycycline IVPB      doxycycline IVPB 100 milliGRAM(s) IV Intermittent every 12 hours  fluticasone propionate 50 MICROgram(s)/spray Nasal Spray 1 Spray(s) Both Nostrils two times a day  glucagon  Injectable 1 milliGRAM(s) IntraMuscular once  heparin   Injectable 5000 Unit(s) SubCutaneous every 8 hours  insulin glargine Injectable (LANTUS) 10 Unit(s) SubCutaneous at bedtime  insulin lispro (ADMELOG) corrective regimen sliding scale   SubCutaneous three times a day before meals  insulin lispro Injectable (ADMELOG) 5 Unit(s) SubCutaneous three times a day before meals  sodium chloride 0.9%. 1000 milliLiter(s) (75 mL/Hr) IV Continuous <Continuous>  tamsulosin 0.8 milliGRAM(s) Oral at bedtime    MEDICATIONS  (PRN):  acetaminophen     Tablet .. 650 milliGRAM(s) Oral every 6 hours PRN Temp greater or equal to 38C (100.4F), Mild Pain (1 - 3)  dextrose Oral Gel 15 Gram(s) Oral once PRN Blood Glucose LESS THAN 70 milliGRAM(s)/deciliter  
  STAN ELLINGTON  67y  Male      Patient is a 67y old  Male who presents with a chief complaint of fever + cough (27 Oct 2022 03:41)      INTERVAL HPI/OVERNIGHT EVENTS:  pt seen this am  -admitted for sepsis/PNA - abx changed to azithro as per ID follow up  -found to be anemic - s/p 2 units PRBC   -GI following - egd/colonoscopy today     REVIEW OF SYSTEMS:  -no new complaints     Vital Signs Last 24 Hrs  T(C): 36.2 (01 Nov 2022 15:03), Max: 37 (31 Oct 2022 19:57)  T(F): 97.1 (01 Nov 2022 15:03), Max: 98.6 (31 Oct 2022 19:57)  HR: 79 (01 Nov 2022 15:03) (63 - 80)  BP: 145/67 (01 Nov 2022 15:03) (136/65 - 160/87)  BP(mean): 86 (01 Nov 2022 11:15) (86 - 86)  RR: 18 (01 Nov 2022 15:03) (16 - 21)  SpO2: 98% (01 Nov 2022 11:45) (96% - 100%)    Parameters below as of 01 Nov 2022 11:45  Patient On (Oxygen Delivery Method): room air    PHYSICAL EXAM:  GENERAL: NAD, speaking appropriately   HEAD:  Atraumatic, Normocephalic  EYES: EOMI, PERRLA, conjunctiva and sclera clear  NERVOUS SYSTEM:  Alert & Oriented X 4, Good concentration  CHEST/LUNG: Rhonchi b/l  CV/HEART: Regular rate and rhythm; No murmurs, rubs, or gallops  GI/ABDOMEN: Soft abdomen   EXTREMITIES:  no edema   SKIN: No rashes or lesions    LAB:                                     9.8    11.39 )-----------( 457      ( 31 Oct 2022 22:39 )             28.5   10-31    135  |  101  |  9<L>  ----------------------------<  167<H>  3.8   |  25  |  0.6<L>    Ca    8.2<L>      31 Oct 2022 22:39  Mg     1.8     10-31    TPro  5.7<L>  /  Alb  3.1<L>  /  TBili  0.6  /  DBili  x   /  AST  135<H>  /  ALT  177<H>  /  AlkPhos  195<H>  10-31                       6.1    6.93  )-----------( 350      ( 30 Oct 2022 23:14 )             17.2                        10-30    140  |  105  |  25<H>  ----------------------------<  300<H>  3.8   |  23  |  0.7    Ca    8.1<L>      30 Oct 2022 08:11  Mg     1.7     10-30    TPro  5.6<L>  /  Alb  3.0<L>  /  TBili  0.5  /  DBili  x   /  AST  143<H>  /  ALT  137<H>  /  AlkPhos  182<H>  10-30        Daily   CAPILLARY BLOOD GLUCOSE      POCT Blood Glucose.: 236 mg/dL (28 Oct 2022 08:03)  POCT Blood Glucose.: 270 mg/dL (27 Oct 2022 22:01)  POCT Blood Glucose.: 179 mg/dL (27 Oct 2022 16:55)  POCT Blood Glucose.: 134 mg/dL (27 Oct 2022 13:57)      LIVER FUNCTIONS - ( 28 Oct 2022 05:59 )  Alb: 3.1 g/dL / Pro: 5.9 g/dL / ALK PHOS: 103 U/L / ALT: 63 U/L / AST: 65 U/L / GGT: x             Culture - Sputum (collected 10-27-22 @ 14:40)  Source: .Sputum Sputum  Gram Stain (10-28-22 @ 09:41):    No polymorphonuclear leukocytes per low power field    Few Squamous epithelial cells per low power field    Numerous Gram Positive Rods seen per oil power field    Numerous Gram Negative Rods seen per oil power field      RADIOLOGY:    Imaging Personally visualized Reviewed:  [ y ] YES  [ ] NO    MEDICATIONS  (STANDING):  azithromycin  IVPB 500 milliGRAM(s) IV Intermittent every 24 hours  dextrose 5%. 1000 milliLiter(s) (50 mL/Hr) IV Continuous <Continuous>  dextrose 5%. 1000 milliLiter(s) (100 mL/Hr) IV Continuous <Continuous>  dextrose 50% Injectable 25 Gram(s) IV Push once  dextrose 50% Injectable 12.5 Gram(s) IV Push once  dextrose 50% Injectable 25 Gram(s) IV Push once  enoxaparin Injectable 40 milliGRAM(s) SubCutaneous every 24 hours  fluticasone propionate 50 MICROgram(s)/spray Nasal Spray 1 Spray(s) Both Nostrils two times a day  glucagon  Injectable 1 milliGRAM(s) IntraMuscular once  insulin glargine Injectable (LANTUS) 15 Unit(s) SubCutaneous at bedtime  insulin lispro (ADMELOG) corrective regimen sliding scale   SubCutaneous three times a day before meals  insulin lispro Injectable (ADMELOG) 7 Unit(s) SubCutaneous three times a day before meals  lactated ringers. 1000 milliLiter(s) (75 mL/Hr) IV Continuous <Continuous>  magnesium oxide 400 milliGRAM(s) Oral three times a day with meals  pantoprazole    Tablet 40 milliGRAM(s) Oral two times a day  tamsulosin 0.8 milliGRAM(s) Oral at bedtime    MEDICATIONS  (PRN):  acetaminophen     Tablet .. 650 milliGRAM(s) Oral every 6 hours PRN Temp greater or equal to 38C (100.4F), Mild Pain (1 - 3)  dextrose Oral Gel 15 Gram(s) Oral once PRN Blood Glucose LESS THAN 70 milliGRAM(s)/deciliter  
  STAN ELLINGTON  67y  Male      Patient is a 67y old  Male who presents with a chief complaint of fever + cough (27 Oct 2022 03:41)      INTERVAL HPI/OVERNIGHT EVENTS:  pt seen this am  -admitted for sepsis/PNA - on abx   -replete electrolytes as needed   -oob to chair as tolerated     REVIEW OF SYSTEMS:  - no new complaints     Vital Signs Last 24 Hrs  T(C): 36.7 (28 Oct 2022 07:03), Max: 38.1 (28 Oct 2022 05:15)  T(F): 98.1 (28 Oct 2022 07:03), Max: 100.6 (28 Oct 2022 05:15)  HR: 69 (28 Oct 2022 07:03) (69 - 112)  BP: 117/57 (28 Oct 2022 07:03) (117/57 - 141/65)  RR: 18 (28 Oct 2022 07:03) (18 - 19)  SpO2: 96% (28 Oct 2022 07:03) (95% - 96%)    Parameters below as of 28 Oct 2022 07:03  Patient On (Oxygen Delivery Method): nasal cannula  O2 Flow (L/min): 2      PHYSICAL EXAM:  GENERAL: NAD, speaking appropriately   HEAD:  Atraumatic, Normocephalic  EYES: EOMI, PERRLA, conjunctiva and sclera clear  NERVOUS SYSTEM:  Alert & Oriented X 4, Good concentration  CHEST/LUNG: Rhonchi b/l  CV/HEART: Regular rate and rhythm; No murmurs, rubs, or gallops  GI/ABDOMEN: Soft abdomen   EXTREMITIES:  no edema   SKIN: No rashes or lesions    LAB:                        11.4   8.77  )-----------( 268      ( 28 Oct 2022 05:59 )             32.5     10-28    134<L>  |  99  |  14  ----------------------------<  200<H>  3.2<L>   |  21  |  0.8    Ca    7.8<L>      28 Oct 2022 05:59  Mg     1.5     10-27    TPro  5.9<L>  /  Alb  3.1<L>  /  TBili  0.6  /  DBili  x   /  AST  65<H>  /  ALT  63<H>  /  AlkPhos  103  10-28      Daily   CAPILLARY BLOOD GLUCOSE      POCT Blood Glucose.: 236 mg/dL (28 Oct 2022 08:03)  POCT Blood Glucose.: 270 mg/dL (27 Oct 2022 22:01)  POCT Blood Glucose.: 179 mg/dL (27 Oct 2022 16:55)  POCT Blood Glucose.: 134 mg/dL (27 Oct 2022 13:57)      LIVER FUNCTIONS - ( 28 Oct 2022 05:59 )  Alb: 3.1 g/dL / Pro: 5.9 g/dL / ALK PHOS: 103 U/L / ALT: 63 U/L / AST: 65 U/L / GGT: x             Culture - Sputum (collected 10-27-22 @ 14:40)  Source: .Sputum Sputum  Gram Stain (10-28-22 @ 09:41):    No polymorphonuclear leukocytes per low power field    Few Squamous epithelial cells per low power field    Numerous Gram Positive Rods seen per oil power field    Numerous Gram Negative Rods seen per oil power field        RADIOLOGY:    Imaging Personally visualized Reviewed:  [ y ] YES  [ ] NO    HEALTH ISSUES - PROBLEM Dx:    MEDS:  acetaminophen     Tablet .. 650 milliGRAM(s) Oral every 6 hours PRN  cefTRIAXone   IVPB 1000 milliGRAM(s) IV Intermittent every 24 hours  dextrose 5%. 1000 milliLiter(s) IV Continuous <Continuous>  dextrose 5%. 1000 milliLiter(s) IV Continuous <Continuous>  dextrose 50% Injectable 25 Gram(s) IV Push once  dextrose 50% Injectable 12.5 Gram(s) IV Push once  dextrose 50% Injectable 25 Gram(s) IV Push once  dextrose Oral Gel 15 Gram(s) Oral once PRN  doxycycline IVPB      doxycycline IVPB 100 milliGRAM(s) IV Intermittent every 12 hours  glucagon  Injectable 1 milliGRAM(s) IntraMuscular once  heparin   Injectable 5000 Unit(s) SubCutaneous every 8 hours  insulin glargine Injectable (LANTUS) 10 Unit(s) SubCutaneous at bedtime  insulin lispro (ADMELOG) corrective regimen sliding scale   SubCutaneous three times a day before meals  insulin lispro Injectable (ADMELOG) 5 Unit(s) SubCutaneous three times a day before meals  potassium chloride   Powder 40 milliEquivalent(s) Oral once  sodium chloride 0.9%. 1000 milliLiter(s) IV Continuous <Continuous>      
  STAN ELLINGTON  67y  Male      Patient is a 67y old  Male who presents with a chief complaint of fever + cough (27 Oct 2022 03:41)      INTERVAL HPI/OVERNIGHT EVENTS:  pt seen this am  -admitted for sepsis/PNA - abx changed to IV cefepime  -found to be anemic and currently onto second unit of prbc transfusion   -GI consult - check iron studies, stool OB, PPI  - phone used     REVIEW OF SYSTEMS:  -slight lethargy but otherwise no complaints     Vital Signs Last 24 Hrs  T(C): 37.1 (31 Oct 2022 06:11), Max: 37.1 (31 Oct 2022 06:11)  T(F): 98.8 (31 Oct 2022 06:11), Max: 98.8 (31 Oct 2022 06:11)  HR: 83 (31 Oct 2022 06:11) (74 - 110)  BP: 132/62 (31 Oct 2022 06:11) (113/62 - 137/67)  BP(mean): 86 (31 Oct 2022 05:26) (84 - 86)  RR: 18 (31 Oct 2022 06:11) (16 - 18)  SpO2: 96% (31 Oct 2022 05:26) (96% - 96%)    Parameters below as of 31 Oct 2022 05:26  Patient On (Oxygen Delivery Method): room air    PHYSICAL EXAM:  GENERAL: NAD, speaking appropriately   HEAD:  Atraumatic, Normocephalic  EYES: EOMI, PERRLA, conjunctiva and sclera clear  NERVOUS SYSTEM:  Alert & Oriented X 4, Good concentration  CHEST/LUNG: Rhonchi b/l  CV/HEART: Regular rate and rhythm; No murmurs, rubs, or gallops  GI/ABDOMEN: Soft abdomen   EXTREMITIES:  no edema   SKIN: No rashes or lesions    LAB:                                    6.1    6.93  )-----------( 350      ( 30 Oct 2022 23:14 )             17.2                        10-30    140  |  105  |  25<H>  ----------------------------<  300<H>  3.8   |  23  |  0.7    Ca    8.1<L>      30 Oct 2022 08:11  Mg     1.7     10-30    TPro  5.6<L>  /  Alb  3.0<L>  /  TBili  0.5  /  DBili  x   /  AST  143<H>  /  ALT  137<H>  /  AlkPhos  182<H>  10-30        Daily   CAPILLARY BLOOD GLUCOSE      POCT Blood Glucose.: 236 mg/dL (28 Oct 2022 08:03)  POCT Blood Glucose.: 270 mg/dL (27 Oct 2022 22:01)  POCT Blood Glucose.: 179 mg/dL (27 Oct 2022 16:55)  POCT Blood Glucose.: 134 mg/dL (27 Oct 2022 13:57)      LIVER FUNCTIONS - ( 28 Oct 2022 05:59 )  Alb: 3.1 g/dL / Pro: 5.9 g/dL / ALK PHOS: 103 U/L / ALT: 63 U/L / AST: 65 U/L / GGT: x             Culture - Sputum (collected 10-27-22 @ 14:40)  Source: .Sputum Sputum  Gram Stain (10-28-22 @ 09:41):    No polymorphonuclear leukocytes per low power field    Few Squamous epithelial cells per low power field    Numerous Gram Positive Rods seen per oil power field    Numerous Gram Negative Rods seen per oil power field      RADIOLOGY:    Imaging Personally visualized Reviewed:  [ y ] YES  [ ] NO    HEALTH ISSUES - PROBLEM Dx:    MEDICATIONS  (STANDING):  cefepime   IVPB      cefepime   IVPB 1000 milliGRAM(s) IV Intermittent every 12 hours  dextrose 5%. 1000 milliLiter(s) (50 mL/Hr) IV Continuous <Continuous>  dextrose 5%. 1000 milliLiter(s) (100 mL/Hr) IV Continuous <Continuous>  dextrose 50% Injectable 25 Gram(s) IV Push once  dextrose 50% Injectable 25 Gram(s) IV Push once  dextrose 50% Injectable 12.5 Gram(s) IV Push once  doxycycline IVPB      doxycycline IVPB 100 milliGRAM(s) IV Intermittent every 12 hours  fluticasone propionate 50 MICROgram(s)/spray Nasal Spray 1 Spray(s) Both Nostrils two times a day  glucagon  Injectable 1 milliGRAM(s) IntraMuscular once  heparin   Injectable 5000 Unit(s) SubCutaneous every 8 hours  insulin glargine Injectable (LANTUS) 15 Unit(s) SubCutaneous at bedtime  insulin lispro (ADMELOG) corrective regimen sliding scale   SubCutaneous three times a day before meals  insulin lispro Injectable (ADMELOG) 7 Unit(s) SubCutaneous three times a day before meals  magnesium oxide 400 milliGRAM(s) Oral three times a day with meals  pantoprazole  Injectable 40 milliGRAM(s) IV Push two times a day  tamsulosin 0.8 milliGRAM(s) Oral at bedtime    MEDICATIONS  (PRN):  acetaminophen     Tablet .. 650 milliGRAM(s) Oral every 6 hours PRN Temp greater or equal to 38C (100.4F), Mild Pain (1 - 3)  dextrose Oral Gel 15 Gram(s) Oral once PRN Blood Glucose LESS THAN 70 milliGRAM(s)/deciliter  
  STAN ELLINGTON  67y  Male      Patient is a 67y old  Male who presents with a chief complaint of fever + cough (27 Oct 2022 03:41)      INTERVAL HPI/OVERNIGHT EVENTS:  pt seen this am  -admitted for sepsis/PNA - abx changed to IV cefepime - doing well - no acute events notified to me   -d/c IVF  -keep active type and screen   -recheck CBC at 8pm and transfuse to keep hb > 7.5 - signed out to on call intern   -replete electrolytes as needed   -oob to chair as tolerated     REVIEW OF SYSTEMS:  - no new complaints     Vital Signs Last 24 Hrs  T(C): 36.9 (30 Oct 2022 04:52), Max: 37.2 (29 Oct 2022 20:08)  T(F): 98.4 (30 Oct 2022 04:52), Max: 98.9 (29 Oct 2022 20:08)  HR: 97 (30 Oct 2022 04:52) (97 - 105)  BP: 143/63 (30 Oct 2022 04:52) (139/64 - 146/62)  BP(mean): --  RR: 18 (30 Oct 2022 04:52) (18 - 18)      PHYSICAL EXAM:  GENERAL: NAD, speaking appropriately   HEAD:  Atraumatic, Normocephalic  EYES: EOMI, PERRLA, conjunctiva and sclera clear  NERVOUS SYSTEM:  Alert & Oriented X 4, Good concentration  CHEST/LUNG: Rhonchi b/l  CV/HEART: Regular rate and rhythm; No murmurs, rubs, or gallops  GI/ABDOMEN: Soft abdomen   EXTREMITIES:  no edema   SKIN: No rashes or lesions    LAB:                                       7.8    8.06  )-----------( 366      ( 30 Oct 2022 08:11 )             22.4          10-30    140  |  105  |  25<H>  ----------------------------<  300<H>  3.8   |  23  |  0.7    Ca    8.1<L>      30 Oct 2022 08:11  Mg     1.7     10-30    TPro  5.6<L>  /  Alb  3.0<L>  /  TBili  0.5  /  DBili  x   /  AST  143<H>  /  ALT  137<H>  /  AlkPhos  182<H>  10-30      Daily   CAPILLARY BLOOD GLUCOSE      POCT Blood Glucose.: 236 mg/dL (28 Oct 2022 08:03)  POCT Blood Glucose.: 270 mg/dL (27 Oct 2022 22:01)  POCT Blood Glucose.: 179 mg/dL (27 Oct 2022 16:55)  POCT Blood Glucose.: 134 mg/dL (27 Oct 2022 13:57)      LIVER FUNCTIONS - ( 28 Oct 2022 05:59 )  Alb: 3.1 g/dL / Pro: 5.9 g/dL / ALK PHOS: 103 U/L / ALT: 63 U/L / AST: 65 U/L / GGT: x             Culture - Sputum (collected 10-27-22 @ 14:40)  Source: .Sputum Sputum  Gram Stain (10-28-22 @ 09:41):    No polymorphonuclear leukocytes per low power field    Few Squamous epithelial cells per low power field    Numerous Gram Positive Rods seen per oil power field    Numerous Gram Negative Rods seen per oil power field      RADIOLOGY:    Imaging Personally visualized Reviewed:  [ y ] YES  [ ] NO    HEALTH ISSUES - PROBLEM Dx:    MEDICATIONS  (STANDING):  cefepime   IVPB      cefepime   IVPB 1000 milliGRAM(s) IV Intermittent every 12 hours  dextrose 5%. 1000 milliLiter(s) (100 mL/Hr) IV Continuous <Continuous>  dextrose 5%. 1000 milliLiter(s) (50 mL/Hr) IV Continuous <Continuous>  dextrose 50% Injectable 25 Gram(s) IV Push once  dextrose 50% Injectable 12.5 Gram(s) IV Push once  dextrose 50% Injectable 25 Gram(s) IV Push once  doxycycline IVPB      doxycycline IVPB 100 milliGRAM(s) IV Intermittent every 12 hours  fluticasone propionate 50 MICROgram(s)/spray Nasal Spray 1 Spray(s) Both Nostrils two times a day  glucagon  Injectable 1 milliGRAM(s) IntraMuscular once  heparin   Injectable 5000 Unit(s) SubCutaneous every 8 hours  insulin glargine Injectable (LANTUS) 10 Unit(s) SubCutaneous at bedtime  insulin lispro (ADMELOG) corrective regimen sliding scale   SubCutaneous three times a day before meals  insulin lispro Injectable (ADMELOG) 5 Unit(s) SubCutaneous three times a day before meals  magnesium oxide 400 milliGRAM(s) Oral three times a day with meals  tamsulosin 0.8 milliGRAM(s) Oral at bedtime    MEDICATIONS  (PRN):  acetaminophen     Tablet .. 650 milliGRAM(s) Oral every 6 hours PRN Temp greater or equal to 38C (100.4F), Mild Pain (1 - 3)  dextrose Oral Gel 15 Gram(s) Oral once PRN Blood Glucose LESS THAN 70 milliGRAM(s)/deciliter  
Patient is a 67y old  Male who presents with a chief complaint of fever + cough (29 Oct 2022 15:58)      INTERVAL HPI/OVERNIGHT EVENTS:  None    T(C): 37.2 (10-29-22 @ 20:08), Max: 37.3 (10-29-22 @ 06:34)  HR: 105 (10-29-22 @ 20:08) (82 - 105)  BP: 139/64 (10-29-22 @ 20:08) (139/64 - 146/62)  RR: 18 (10-29-22 @ 20:08) (18 - 18)  SpO2: --  Wt(kg): --Vital Signs Last 24 Hrs  T(C): 37.2 (29 Oct 2022 20:08), Max: 37.3 (29 Oct 2022 06:34)  T(F): 98.9 (29 Oct 2022 20:08), Max: 99.1 (29 Oct 2022 06:34)  HR: 105 (29 Oct 2022 20:08) (82 - 105)  BP: 139/64 (29 Oct 2022 20:08) (139/64 - 146/62)  BP(mean): --  RR: 18 (29 Oct 2022 20:08) (18 - 18)  SpO2: --        PHYSICAL EXAM:  GENERAL: NAD, speaking appropriately   HEAD:  Atraumatic, Normocephalic  EYES: EOMI, PERRLA, conjunctiva and sclera clear  NERVOUS SYSTEM:  Alert & Oriented X 4, Good concentration  CHEST/LUNG: Rhonchi b/l  CV/HEART: Regular rate and rhythm; No murmurs, rubs, or gallops  GI/ABDOMEN: Soft abdomen   EXTREMITIES:  no edema   SKIN: No rashes or lesions      acetaminophen     Tablet .. 650 milliGRAM(s) Oral every 6 hours PRN  cefepime   IVPB      cefepime   IVPB 1000 milliGRAM(s) IV Intermittent every 12 hours  dextrose 5%. 1000 milliLiter(s) IV Continuous <Continuous>  dextrose 5%. 1000 milliLiter(s) IV Continuous <Continuous>  dextrose 50% Injectable 25 Gram(s) IV Push once  dextrose 50% Injectable 12.5 Gram(s) IV Push once  dextrose 50% Injectable 25 Gram(s) IV Push once  dextrose Oral Gel 15 Gram(s) Oral once PRN  doxycycline IVPB      doxycycline IVPB 100 milliGRAM(s) IV Intermittent every 12 hours  fluticasone propionate 50 MICROgram(s)/spray Nasal Spray 1 Spray(s) Both Nostrils two times a day  glucagon  Injectable 1 milliGRAM(s) IntraMuscular once  heparin   Injectable 5000 Unit(s) SubCutaneous every 8 hours  insulin glargine Injectable (LANTUS) 10 Unit(s) SubCutaneous at bedtime  insulin lispro (ADMELOG) corrective regimen sliding scale   SubCutaneous three times a day before meals  insulin lispro Injectable (ADMELOG) 5 Unit(s) SubCutaneous three times a day before meals  sodium chloride 0.9%. 1000 milliLiter(s) IV Continuous <Continuous>  tamsulosin 0.8 milliGRAM(s) Oral at bedtime      HEALTH ISSUES - PROBLEM Dx:

## 2022-11-02 ENCOUNTER — TRANSCRIPTION ENCOUNTER (OUTPATIENT)
Age: 67
End: 2022-11-02

## 2022-11-02 VITALS — TEMPERATURE: 98 F | HEART RATE: 89 BPM | SYSTOLIC BLOOD PRESSURE: 144 MMHG | DIASTOLIC BLOOD PRESSURE: 69 MMHG

## 2022-11-02 LAB
ALBUMIN SERPL ELPH-MCNC: 3 G/DL — LOW (ref 3.5–5.2)
ALP SERPL-CCNC: 156 U/L — HIGH (ref 30–115)
ALT FLD-CCNC: 108 U/L — HIGH (ref 0–41)
ANION GAP SERPL CALC-SCNC: 11 MMOL/L — SIGNIFICANT CHANGE UP (ref 7–14)
AST SERPL-CCNC: 49 U/L — HIGH (ref 0–41)
BASOPHILS # BLD AUTO: 0.03 K/UL — SIGNIFICANT CHANGE UP (ref 0–0.2)
BASOPHILS NFR BLD AUTO: 0.3 % — SIGNIFICANT CHANGE UP (ref 0–1)
BILIRUB SERPL-MCNC: 0.6 MG/DL — SIGNIFICANT CHANGE UP (ref 0.2–1.2)
BUN SERPL-MCNC: 4 MG/DL — LOW (ref 10–20)
CALCIUM SERPL-MCNC: 8.2 MG/DL — LOW (ref 8.4–10.5)
CHLORIDE SERPL-SCNC: 107 MMOL/L — SIGNIFICANT CHANGE UP (ref 98–110)
CO2 SERPL-SCNC: 25 MMOL/L — SIGNIFICANT CHANGE UP (ref 17–32)
CREAT SERPL-MCNC: 0.6 MG/DL — LOW (ref 0.7–1.5)
EGFR: 105 ML/MIN/1.73M2 — SIGNIFICANT CHANGE UP
EOSINOPHIL # BLD AUTO: 0.09 K/UL — SIGNIFICANT CHANGE UP (ref 0–0.7)
EOSINOPHIL NFR BLD AUTO: 1 % — SIGNIFICANT CHANGE UP (ref 0–8)
GLUCOSE BLDC GLUCOMTR-MCNC: 134 MG/DL — HIGH (ref 70–99)
GLUCOSE BLDC GLUCOMTR-MCNC: 281 MG/DL — HIGH (ref 70–99)
GLUCOSE SERPL-MCNC: 109 MG/DL — HIGH (ref 70–99)
HCT VFR BLD CALC: 27.7 % — LOW (ref 42–52)
HGB BLD-MCNC: 9.3 G/DL — LOW (ref 14–18)
IMM GRANULOCYTES NFR BLD AUTO: 6.3 % — HIGH (ref 0.1–0.3)
LYMPHOCYTES # BLD AUTO: 1.05 K/UL — LOW (ref 1.2–3.4)
LYMPHOCYTES # BLD AUTO: 12.2 % — LOW (ref 20.5–51.1)
MAGNESIUM SERPL-MCNC: 1.9 MG/DL — SIGNIFICANT CHANGE UP (ref 1.8–2.4)
MCHC RBC-ENTMCNC: 29.9 PG — SIGNIFICANT CHANGE UP (ref 27–31)
MCHC RBC-ENTMCNC: 33.6 G/DL — SIGNIFICANT CHANGE UP (ref 32–37)
MCV RBC AUTO: 89.1 FL — SIGNIFICANT CHANGE UP (ref 80–94)
MONOCYTES # BLD AUTO: 0.7 K/UL — HIGH (ref 0.1–0.6)
MONOCYTES NFR BLD AUTO: 8.1 % — SIGNIFICANT CHANGE UP (ref 1.7–9.3)
NEUTROPHILS # BLD AUTO: 6.18 K/UL — SIGNIFICANT CHANGE UP (ref 1.4–6.5)
NEUTROPHILS NFR BLD AUTO: 72.1 % — SIGNIFICANT CHANGE UP (ref 42.2–75.2)
NRBC # BLD: 0 /100 WBCS — SIGNIFICANT CHANGE UP (ref 0–0)
PLATELET # BLD AUTO: 470 K/UL — HIGH (ref 130–400)
POTASSIUM SERPL-MCNC: 4 MMOL/L — SIGNIFICANT CHANGE UP (ref 3.5–5)
POTASSIUM SERPL-SCNC: 4 MMOL/L — SIGNIFICANT CHANGE UP (ref 3.5–5)
PROT SERPL-MCNC: 5.6 G/DL — LOW (ref 6–8)
RBC # BLD: 3.11 M/UL — LOW (ref 4.7–6.1)
RBC # FLD: 14.2 % — SIGNIFICANT CHANGE UP (ref 11.5–14.5)
SODIUM SERPL-SCNC: 143 MMOL/L — SIGNIFICANT CHANGE UP (ref 135–146)
WBC # BLD: 8.59 K/UL — SIGNIFICANT CHANGE UP (ref 4.8–10.8)
WBC # FLD AUTO: 8.59 K/UL — SIGNIFICANT CHANGE UP (ref 4.8–10.8)

## 2022-11-02 PROCEDURE — 99239 HOSP IP/OBS DSCHRG MGMT >30: CPT

## 2022-11-02 PROCEDURE — 93010 ELECTROCARDIOGRAM REPORT: CPT

## 2022-11-02 RX ORDER — AZITHROMYCIN 500 MG/1
1 TABLET, FILM COATED ORAL
Qty: 7 | Refills: 0
Start: 2022-11-02 | End: 2022-11-08

## 2022-11-02 RX ORDER — PANTOPRAZOLE SODIUM 20 MG/1
1 TABLET, DELAYED RELEASE ORAL
Qty: 120 | Refills: 0
Start: 2022-11-02 | End: 2022-12-31

## 2022-11-02 RX ADMIN — Medication 1 SPRAY(S): at 06:03

## 2022-11-02 RX ADMIN — Medication 7 UNIT(S): at 12:00

## 2022-11-02 RX ADMIN — MAGNESIUM OXIDE 400 MG ORAL TABLET 400 MILLIGRAM(S): 241.3 TABLET ORAL at 12:00

## 2022-11-02 RX ADMIN — MAGNESIUM OXIDE 400 MG ORAL TABLET 400 MILLIGRAM(S): 241.3 TABLET ORAL at 08:36

## 2022-11-02 RX ADMIN — Medication 7 UNIT(S): at 08:28

## 2022-11-02 RX ADMIN — PANTOPRAZOLE SODIUM 40 MILLIGRAM(S): 20 TABLET, DELAYED RELEASE ORAL at 06:02

## 2022-11-02 RX ADMIN — Medication 3: at 11:38

## 2022-11-02 NOTE — DISCHARGE NOTE PROVIDER - PROVIDER TOKENS
PROVIDER:[TOKEN:[[id number]],FOLLOWUP:[2 weeks],ESTABLISHEDPATIENT:[T]],PROVIDER:[TOKEN:[[id number]],FOLLOWUP:[2 weeks],ESTABLISHEDPATIENT:[T]]

## 2022-11-02 NOTE — DISCHARGE NOTE PROVIDER - ATTENDING DISCHARGE PHYSICAL EXAMINATION:
PHYSICAL EXAM    GEN: no distress, comfortable  PULM: BS heard b/l equal, No wheezing  CVS: S1S2 present, no rubs or gallops  ABD: Soft, non-distended, no guarding; non-tender  EXT: No lower extremity edema  NEURO: A&Ox3, moving all extremities

## 2022-11-02 NOTE — DISCHARGE NOTE PROVIDER - HOSPITAL COURSE
This is a 68 yo male, [place] speaking,  PMhx of HTN, HLD, DM presenting for fever and productive cough. Patient found to have Legionella pneumonia with RUL consolidation, Urine Legionella Antigen positive. Patient was evaluated by ID team and started on azithromycin 500 mg daily - plan for 10 day course end date 11/9/2022. Patient's hospital course was further complicated by acute drop in Hb with positive SALAS, s/p 2U PRBC. Patient had EGD+Colonoscopy on 11/1/2022.   EGD Impressions:  - Normal mucosa in the whole esophagus.  - Ulcer in the pre-pyloric region. (clean based)  - Ulcers in the fundus. (clean based)  - Erosions in the stomach compatible with erosive gastritis.  - Normal mucosa in the whole examined duodenum.    Colonoscopy Impressions (poor preparation):  - Normal mucosa in the colon.  - Moderate diverticulosis of the left side of the colon.  - Internal and external hemorrhoids.    Patient's Hb is stable and tolerating diet. Patient will need  Colonoscopy in 1 year  for screening. Repeat EGD in 8 weeks to confirm healing of gastric ulcers.  Patient will need outpatient Follow-up with GI visit in 2-3 weeks and c/w Protonix 40 mg po bid.      Patient is medically stable and ready for discharge.

## 2022-11-02 NOTE — DISCHARGE NOTE PROVIDER - NSDCMRMEDTOKEN_GEN_ALL_CORE_FT
amLODIPine 5 mg oral tablet: 1 tab(s) orally once a day  atorvastatin 10 mg oral tablet: 1 tab(s) orally once a day  azithromycin 500 mg oral tablet: 1 tab(s) orally once a day   empagliflozin 25 mg oral tablet: 1 tab(s) orally once a day (in the morning)  Flonase 50 mcg/inh nasal spray: 1 spray(s) nasal once a day  hydroCHLOROthiazide 12.5 mg oral capsule: 1 cap(s) orally once a day  losartan 100 mg oral tablet: 1 tab(s) orally once a day  metFORMIN 1000 mg oral tablet: 1 tab(s) orally 2 times a day  Metoprolol Tartrate 25 mg oral tablet: 1 tab(s) orally 2 times a day  pantoprazole 40 mg oral delayed release tablet: 1 tab(s) orally 2 times a day  SITagliptin 100 mg oral tablet: 1 tab(s) orally once a day  tamsulosin 0.4 mg oral capsule: 2 cap(s) orally once a day

## 2022-11-02 NOTE — CHART NOTE - NSCHARTNOTEFT_GEN_A_CORE
Called by lab - pt's urine is positive for legionella  pt currently already on azithromycin
Pt seen and examined. Pt evaluated by overnight nocturnist. Admitted for treatment of sepsis due to CAP. CXR showing RUL consolidation.  Cont ceftriaxone and doxycycline for now. Monitor CBC and for clinical resolution. Pt also with SANCHEZ, likely pre-renal. f/u renal function on IVF.
EGD Impressions:  	Normal mucosa in the whole esophagus.  	Ulcer in the pre-pyloric region. (clean based)  	Ulcers in the fundus. (clean based)  	Erosions in the stomach compatible with erosive gastritis.  	Normal mucosa in the whole examined duodenum.    Colonoscopy Impressions (poor preparation):  	Normal mucosa in the colon.  	Moderate diverticulosis of the left side of the colon.  	Internal and external hemorrhoids.    Plan:   Colonoscopy in 1 year  for screening.   Advance diet as tolerated     Follow-up office visit in 2-3 weeks     Protonix 40 mg po bid   Ulcer Diet     Repeat EGD in 8 weeks to confirm healing of gastric ulcers.    Await pathology

## 2022-11-02 NOTE — DISCHARGE NOTE NURSING/CASE MANAGEMENT/SOCIAL WORK - PATIENT PORTAL LINK FT
You can access the FollowMyHealth Patient Portal offered by [facility] by registering at the following website: [email or web address]. By joining [facility]’s FollowMyHealth portal, you will also be able to view your health information using other applications (apps) compatible with our system.

## 2022-11-02 NOTE — DISCHARGE NOTE PROVIDER - CARE PROVIDER_API CALL
Ashley Figueroa)  Gastroenterology; Internal Medicine  [address] [place] [address]  Phone: [phone]  Fax: [phone]  Established Patient  Follow Up Time: 2 weeks    TITA LOUISE  Internal Medicine  Phone: [phone]  Fax: [phone])-  Established Patient  Follow Up Time: 2 weeks

## 2022-11-02 NOTE — DISCHARGE NOTE PROVIDER - NSDCCPCAREPLAN_GEN_ALL_CORE_FT
PRINCIPAL DISCHARGE DIAGNOSIS  Diagnosis: Pneumonia  Assessment and Plan of Treatment:   Please take your medications as directed. Don’t skip doses. Follow up with your primary care physician within 3 days. Continue taking your antibiotics as directed until they are all gone—even if you start to feel better. This will prevent the pneumonia from  Coughing up mucus is normal. Don’t use medicines to suppress your cough unless your cough is dry, painful, or interferes with your sleep. Get plenty of rest until your fever, shortness of breath, and chest pain go away. Plan to get a flu shot every year. Ask your primary care doctor about pneumonia vaccines.  Seek immediate medical attention if you experience chest pain, trouble breathing, blue lips or fingernails, fever of 100.4°F  (38°C) or higher, yellow, green, bloody, or smelly sputum, more than normal mucus production, vomiting or diarrhea.      SECONDARY DISCHARGE DIAGNOSES  Diagnosis: Drop in hemoglobin  Assessment and Plan of Treatment: You received an upper endoscopy which showed the presence of nonbleeding ulcers. It is very likely your dark stool is due to intermittent bleeding from these ulcers. It is very important for you to stop taking Aspirin or any NSAIDS including Advil, Motrin, or Ibuprofen. You should also decrease and eliminate the use of caffeine and spicy foods as they can irritate your stomach lining. It is also imperative that you begin or continue taking PPIs to reduce the amount of acid production in your stomach. Please follow up with your PMD for continued monitoring of symptoms.   Please follow up with Gastroenterology as outpatient in 2-3 weeks. You will need to repeat Colonoscopy in 1 year  for screening. Repeat endorcopy in 8 weeks to confirm healing of gastric ulcers.

## 2022-11-02 NOTE — DISCHARGE NOTE NURSING/CASE MANAGEMENT/SOCIAL WORK - NSDCPEFALRISK_GEN_ALL_CORE
For information on Fall & Injury Prevention, visit: [email or web address] OR  [email or web address] OR  https://www.cdc.gov/steadi/patient.html

## 2022-11-03 LAB — SURGICAL PATHOLOGY STUDY: SIGNIFICANT CHANGE UP

## 2022-11-09 DIAGNOSIS — A41.9 SEPSIS, UNSPECIFIED ORGANISM: ICD-10-CM

## 2022-11-09 DIAGNOSIS — E87.1 HYPO-OSMOLALITY AND HYPONATREMIA: ICD-10-CM

## 2022-11-09 DIAGNOSIS — E11.65 TYPE 2 DIABETES MELLITUS WITH HYPERGLYCEMIA: ICD-10-CM

## 2022-11-09 DIAGNOSIS — N17.9 ACUTE KIDNEY FAILURE, UNSPECIFIED: ICD-10-CM

## 2022-11-09 DIAGNOSIS — Z79.84 LONG TERM (CURRENT) USE OF ORAL HYPOGLYCEMIC DRUGS: ICD-10-CM

## 2022-11-09 DIAGNOSIS — E87.6 HYPOKALEMIA: ICD-10-CM

## 2022-11-09 DIAGNOSIS — A41.59 OTHER GRAM-NEGATIVE SEPSIS: ICD-10-CM

## 2022-11-09 DIAGNOSIS — D64.9 ANEMIA, UNSPECIFIED: ICD-10-CM

## 2022-11-09 DIAGNOSIS — K25.4 CHRONIC OR UNSPECIFIED GASTRIC ULCER WITH HEMORRHAGE: ICD-10-CM

## 2022-11-09 DIAGNOSIS — K57.30 DIVERTICULOSIS OF LARGE INTESTINE WITHOUT PERFORATION OR ABSCESS WITHOUT BLEEDING: ICD-10-CM

## 2022-11-09 DIAGNOSIS — I10 ESSENTIAL (PRIMARY) HYPERTENSION: ICD-10-CM

## 2022-11-09 DIAGNOSIS — K76.0 FATTY (CHANGE OF) LIVER, NOT ELSEWHERE CLASSIFIED: ICD-10-CM

## 2022-11-09 DIAGNOSIS — E86.1 HYPOVOLEMIA: ICD-10-CM

## 2022-11-09 DIAGNOSIS — N40.0 BENIGN PROSTATIC HYPERPLASIA WITHOUT LOWER URINARY TRACT SYMPTOMS: ICD-10-CM

## 2022-11-09 DIAGNOSIS — K64.4 RESIDUAL HEMORRHOIDAL SKIN TAGS: ICD-10-CM

## 2022-11-09 DIAGNOSIS — K29.00 ACUTE GASTRITIS WITHOUT BLEEDING: ICD-10-CM

## 2022-11-09 DIAGNOSIS — R74.01 ELEVATION OF LEVELS OF LIVER TRANSAMINASE LEVELS: ICD-10-CM

## 2022-11-09 DIAGNOSIS — Z79.899 OTHER LONG TERM (CURRENT) DRUG THERAPY: ICD-10-CM

## 2022-11-09 DIAGNOSIS — E83.42 HYPOMAGNESEMIA: ICD-10-CM

## 2022-11-09 DIAGNOSIS — K64.8 OTHER HEMORRHOIDS: ICD-10-CM

## 2022-11-09 PROBLEM — Z00.00 ENCOUNTER FOR PREVENTIVE HEALTH EXAMINATION: Status: ACTIVE | Noted: 2022-11-09

## 2022-11-10 PROBLEM — I10 ESSENTIAL (PRIMARY) HYPERTENSION: Chronic | Status: ACTIVE | Noted: 2022-10-27

## 2022-11-10 PROBLEM — E11.9 TYPE 2 DIABETES MELLITUS WITHOUT COMPLICATIONS: Chronic | Status: ACTIVE | Noted: 2022-10-27

## 2022-11-10 PROBLEM — E78.5 HYPERLIPIDEMIA, UNSPECIFIED: Chronic | Status: ACTIVE | Noted: 2022-10-27

## 2022-12-09 ENCOUNTER — APPOINTMENT (OUTPATIENT)
Dept: GASTROENTEROLOGY | Facility: CLINIC | Age: 67
End: 2022-12-09